# Patient Record
Sex: MALE | Race: WHITE | Employment: FULL TIME | RURAL
[De-identification: names, ages, dates, MRNs, and addresses within clinical notes are randomized per-mention and may not be internally consistent; named-entity substitution may affect disease eponyms.]

---

## 2021-12-03 ENCOUNTER — HOSPITAL ENCOUNTER (EMERGENCY)
Facility: HOSPITAL | Age: 26
Discharge: HOME | End: 2021-12-04
Attending: FAMILY MEDICINE
Payer: COMMERCIAL

## 2021-12-03 DIAGNOSIS — K22.2 ESOPHAGEAL OBSTRUCTION DUE TO FOOD IMPACTION: Primary | ICD-10-CM

## 2021-12-03 DIAGNOSIS — T18.128A ESOPHAGEAL OBSTRUCTION DUE TO FOOD IMPACTION: Primary | ICD-10-CM

## 2021-12-03 PROCEDURE — 99282 EMERGENCY DEPT VISIT SF MDM: CPT

## 2021-12-04 ENCOUNTER — HOSPITAL ENCOUNTER (OUTPATIENT)
Facility: HOSPITAL | Age: 26
Setting detail: HOSPITAL OUTPATIENT SURGERY
Discharge: HOME | End: 2021-12-04
Attending: INTERNAL MEDICINE | Admitting: INTERNAL MEDICINE

## 2021-12-04 ENCOUNTER — ANESTHESIA (OUTPATIENT)
Dept: GASTROENTEROLOGY | Facility: HOSPITAL | Age: 26
End: 2021-12-04

## 2021-12-04 ENCOUNTER — ANESTHESIA EVENT (OUTPATIENT)
Dept: GASTROENTEROLOGY | Facility: HOSPITAL | Age: 26
End: 2021-12-04

## 2021-12-04 VITALS
SYSTOLIC BLOOD PRESSURE: 124 MMHG | OXYGEN SATURATION: 99 % | RESPIRATION RATE: 18 BRPM | BODY MASS INDEX: 17.61 KG/M2 | TEMPERATURE: 98.6 F | DIASTOLIC BLOOD PRESSURE: 67 MMHG | HEART RATE: 69 BPM | WEIGHT: 130 LBS | HEIGHT: 72 IN

## 2021-12-04 VITALS
BODY MASS INDEX: 17.44 KG/M2 | OXYGEN SATURATION: 100 % | RESPIRATION RATE: 18 BRPM | HEART RATE: 89 BPM | HEIGHT: 72 IN | TEMPERATURE: 98 F | DIASTOLIC BLOOD PRESSURE: 50 MMHG | SYSTOLIC BLOOD PRESSURE: 106 MMHG | WEIGHT: 128.8 LBS

## 2021-12-04 DIAGNOSIS — R13.10 DYSPHAGIA: ICD-10-CM

## 2021-12-04 DIAGNOSIS — K20.0 EOSINOPHILIC ESOPHAGITIS: Primary | ICD-10-CM

## 2021-12-04 PROCEDURE — 43247 EGD REMOVE FOREIGN BODY: CPT | Performed by: INTERNAL MEDICINE

## 2021-12-04 PROCEDURE — 25010000002 MIDAZOLAM PER 1MG: Performed by: NURSE ANESTHETIST, CERTIFIED REGISTERED

## 2021-12-04 PROCEDURE — 25010000002 DEXAMETHASONE PER 1 MG: Performed by: NURSE ANESTHETIST, CERTIFIED REGISTERED

## 2021-12-04 PROCEDURE — 25010000002 METOCLOPRAMIDE PER 10 MG: Performed by: NURSE ANESTHETIST, CERTIFIED REGISTERED

## 2021-12-04 PROCEDURE — 99204 OFFICE O/P NEW MOD 45 MIN: CPT | Performed by: INTERNAL MEDICINE

## 2021-12-04 PROCEDURE — 25010000002 ONDANSETRON PER 1 MG: Performed by: NURSE ANESTHETIST, CERTIFIED REGISTERED

## 2021-12-04 PROCEDURE — 25010000002 FENTANYL CITRATE (PF) 50 MCG/ML SOLUTION: Performed by: NURSE ANESTHETIST, CERTIFIED REGISTERED

## 2021-12-04 PROCEDURE — 25010000002 PROPOFOL 10 MG/ML EMULSION: Performed by: NURSE ANESTHETIST, CERTIFIED REGISTERED

## 2021-12-04 PROCEDURE — 43239 EGD BIOPSY SINGLE/MULTIPLE: CPT | Performed by: INTERNAL MEDICINE

## 2021-12-04 RX ORDER — SODIUM CHLORIDE 9 MG/ML
50 INJECTION, SOLUTION INTRAVENOUS CONTINUOUS
Status: DISCONTINUED | OUTPATIENT
Start: 2021-12-04 | End: 2021-12-04 | Stop reason: HOSPADM

## 2021-12-04 RX ORDER — ERGOCALCIFEROL 1.25 MG/1
50000 CAPSULE ORAL WEEKLY
COMMUNITY

## 2021-12-04 RX ORDER — MULTIVIT WITH MINERALS/LUTEIN
250 TABLET ORAL DAILY
COMMUNITY

## 2021-12-04 RX ORDER — MIDAZOLAM HYDROCHLORIDE 2 MG/2ML
INJECTION, SOLUTION INTRAMUSCULAR; INTRAVENOUS AS NEEDED
Status: DISCONTINUED | OUTPATIENT
Start: 2021-12-04 | End: 2021-12-04 | Stop reason: SURG

## 2021-12-04 RX ORDER — MEPERIDINE HYDROCHLORIDE 25 MG/ML
50 INJECTION INTRAMUSCULAR; INTRAVENOUS; SUBCUTANEOUS
Status: DISCONTINUED | OUTPATIENT
Start: 2021-12-04 | End: 2021-12-04 | Stop reason: HOSPADM

## 2021-12-04 RX ORDER — PANTOPRAZOLE SODIUM 40 MG/1
40 TABLET, DELAYED RELEASE ORAL 2 TIMES DAILY
Qty: 60 TABLET | Refills: 2 | Status: SHIPPED | OUTPATIENT
Start: 2021-12-04 | End: 2022-02-22 | Stop reason: SDUPTHER

## 2021-12-04 RX ORDER — PROPOFOL 10 MG/ML
VIAL (ML) INTRAVENOUS AS NEEDED
Status: DISCONTINUED | OUTPATIENT
Start: 2021-12-04 | End: 2021-12-04 | Stop reason: SURG

## 2021-12-04 RX ORDER — MULTIVIT-MIN/IRON/FOLIC ACID/K 18-600-40
1 CAPSULE ORAL DAILY
COMMUNITY

## 2021-12-04 RX ORDER — DEXAMETHASONE SODIUM PHOSPHATE 4 MG/ML
INJECTION, SOLUTION INTRA-ARTICULAR; INTRALESIONAL; INTRAMUSCULAR; INTRAVENOUS; SOFT TISSUE AS NEEDED
Status: DISCONTINUED | OUTPATIENT
Start: 2021-12-04 | End: 2021-12-04 | Stop reason: SURG

## 2021-12-04 RX ORDER — FENTANYL CITRATE 50 UG/ML
INJECTION, SOLUTION INTRAMUSCULAR; INTRAVENOUS AS NEEDED
Status: DISCONTINUED | OUTPATIENT
Start: 2021-12-04 | End: 2021-12-04 | Stop reason: SURG

## 2021-12-04 RX ORDER — METOCLOPRAMIDE HYDROCHLORIDE 5 MG/ML
INJECTION INTRAMUSCULAR; INTRAVENOUS AS NEEDED
Status: DISCONTINUED | OUTPATIENT
Start: 2021-12-04 | End: 2021-12-04 | Stop reason: SURG

## 2021-12-04 RX ORDER — ONDANSETRON 2 MG/ML
4 INJECTION INTRAMUSCULAR; INTRAVENOUS ONCE AS NEEDED
Status: DISCONTINUED | OUTPATIENT
Start: 2021-12-04 | End: 2021-12-04 | Stop reason: HOSPADM

## 2021-12-04 RX ORDER — LANOLIN ALCOHOL/MO/W.PET/CERES
1000 CREAM (GRAM) TOPICAL DAILY
COMMUNITY

## 2021-12-04 RX ORDER — LEVOTHYROXINE SODIUM 0.12 MG/1
125 TABLET ORAL DAILY
COMMUNITY

## 2021-12-04 RX ORDER — ONDANSETRON 2 MG/ML
INJECTION INTRAMUSCULAR; INTRAVENOUS AS NEEDED
Status: DISCONTINUED | OUTPATIENT
Start: 2021-12-04 | End: 2021-12-04 | Stop reason: SURG

## 2021-12-04 RX ORDER — MONTELUKAST SODIUM 10 MG/1
10 TABLET ORAL NIGHTLY
COMMUNITY
End: 2022-02-22 | Stop reason: ALTCHOICE

## 2021-12-04 RX ORDER — LIDOCAINE HYDROCHLORIDE 20 MG/ML
INJECTION, SOLUTION INTRAVENOUS AS NEEDED
Status: DISCONTINUED | OUTPATIENT
Start: 2021-12-04 | End: 2021-12-04 | Stop reason: SURG

## 2021-12-04 RX ORDER — PANTOPRAZOLE SODIUM 40 MG/1
40 TABLET, DELAYED RELEASE ORAL 2 TIMES DAILY
Qty: 60 TABLET | Refills: 2 | Status: SHIPPED | OUTPATIENT
Start: 2021-12-04 | End: 2022-01-11 | Stop reason: HOSPADM

## 2021-12-04 RX ADMIN — FENTANYL CITRATE 50 MCG: 50 INJECTION, SOLUTION INTRAMUSCULAR; INTRAVENOUS at 02:20

## 2021-12-04 RX ADMIN — PROPOFOL 50 MG: 10 INJECTION, EMULSION INTRAVENOUS at 02:33

## 2021-12-04 RX ADMIN — PROPOFOL 50 MG: 10 INJECTION, EMULSION INTRAVENOUS at 02:37

## 2021-12-04 RX ADMIN — FENTANYL CITRATE 50 MCG: 50 INJECTION, SOLUTION INTRAMUSCULAR; INTRAVENOUS at 02:29

## 2021-12-04 RX ADMIN — GLYCOPYRROLATE 0.2 MCG: 0.2 INJECTION, SOLUTION INTRAMUSCULAR; INTRAVITREAL at 02:20

## 2021-12-04 RX ADMIN — SODIUM CHLORIDE 50 ML/HR: 9 INJECTION, SOLUTION INTRAVENOUS at 02:15

## 2021-12-04 RX ADMIN — ONDANSETRON 4 MG: 2 INJECTION INTRAMUSCULAR; INTRAVENOUS at 02:29

## 2021-12-04 RX ADMIN — DEXAMETHASONE SODIUM PHOSPHATE 10 MG: 4 INJECTION, SOLUTION INTRAMUSCULAR; INTRAVENOUS at 02:29

## 2021-12-04 RX ADMIN — PROPOFOL 100 MG: 10 INJECTION, EMULSION INTRAVENOUS at 02:29

## 2021-12-04 RX ADMIN — LIDOCAINE HYDROCHLORIDE 100 MG: 20 INJECTION, SOLUTION INTRAVENOUS at 02:29

## 2021-12-04 RX ADMIN — MIDAZOLAM HYDROCHLORIDE 2 MG: 1 INJECTION, SOLUTION INTRAMUSCULAR; INTRAVENOUS at 02:29

## 2021-12-04 RX ADMIN — METOCLOPRAMIDE 10 MG: 5 INJECTION, SOLUTION INTRAMUSCULAR; INTRAVENOUS at 02:29

## 2021-12-04 ASSESSMENT — PAIN DESCRIPTION - FREQUENCY: FREQUENCY: CONTINUOUS

## 2021-12-04 ASSESSMENT — ENCOUNTER SYMPTOMS: VOMITING: 1

## 2021-12-04 ASSESSMENT — PAIN DESCRIPTION - LOCATION: LOCATION: CHEST

## 2021-12-04 ASSESSMENT — PAIN DESCRIPTION - ORIENTATION: ORIENTATION: MID

## 2021-12-04 NOTE — ED NOTES
Pt resting on bed.  Pt states is very anxious but understands need for going to 6 Manish Veras RN  12/04/21 011

## 2021-12-04 NOTE — DISCHARGE INSTRUCTIONS
No pushing, pulling, tugging,  heavy lifting, or strenuous activity.  No major decision making, driving, or drinking alcoholic beverages for 24 hours. ( due to the medications you have  received)  Always use good hand hygiene/washing techniques.  NO driving while taking pain medications.    * if you have an incision:  Check your incision area every day for signs of infection.   Check for:  * more redness, swelling, or pain  *more fluid or blood  *warmth  *pus or bad smell  To assist you in voiding:  Drink plenty of fluids  Listen to running water while attempting to void.    If you are unable to urinate and you have an uncomfortable urge to void or it has been   6 hours since you were discharged, return to the Emergency Room    - Discharge patient to home (ambulatory).   - Mechanical soft diet today.   - Chopped food from tomorrow  - Repeat upper endoscopy and dilation with savary 12-14 or Balloon 12-15mm in 4 weeks for retreatment.   - PPI BID for 3 months followed by daily  - Food allergy profile  - Will consider topical steriods after path report  - Return to GI office after next EGD.

## 2021-12-04 NOTE — ED TRIAGE NOTES
Pt states was eating boneless chicken and bread approx 2 hours ago. Pt got choked and has been feeling a choking sensation, pressure in chest, difficulty swallowing spit. Pt can talk easily.  No coughing noted

## 2021-12-04 NOTE — ED PROVIDER NOTES
[address]  eMERGENCY dEPARTMENT eNCOUnter      Pt Name: Gabi Power  MRN: [id number]  [place] 1995  Date of evaluation: 12/3/2021  Provider: Jonah Miller DO    CHIEF COMPLAINT       Chief Complaint   Patient presents with    Foreign Body         HISTORY OF PRESENT ILLNESS   (Location/Symptom, Timing/Onset, Context/Setting, Quality, Duration, Modifying Factors, Severity)  Note limiting factors. Gabi Power is a 31 y.o. male who presents to the emergency department for having possible esophageal foreign body. Pt states that around 7:30 pm, he was eating at the [facility]. He was eating chicken and bread. He felt while eating food that it got stuck. He has tried to drink liquids but vomits it up and spitting up his saliva. Pt without any history of needing endoscopy for removal. In the past, he has felt like some food got stuck but eventually got it to go down. States it has happened about 10 times in a year. Nursing Notes were reviewed. REVIEW OF SYSTEMS    (2-9 systems for level 4, 10 or more forlevel 5)     Review of Systems   Gastrointestinal: Positive for vomiting. Pt spitting up saliva and feels like food is stuck down in his esophagus   All other systems reviewed and are negative. PAST MEDICAL HISTORY     Past Medical History:   Diagnosis Date    Heart murmur          SURGICAL HISTORY       Past Surgical History:   Procedure Laterality Date    SKIN CANCER EXCISION           CURRENT MEDICATIONS       Current Discharge Medication List      CONTINUE these medications which have NOT CHANGED    Details   Cholecalciferol (VITAMIN D) 50 MCG (2000 UT) CAPS capsule Take 1 capsule by mouth daily      Multiple Vitamin (MULTI VITAMIN DAILY PO) Take by mouth daily      cetirizine (ZYRTEC) 10 MG tablet Take 10 mg by mouth daily             ALLERGIES     Patient has no known allergies. FAMILY HISTORY     History reviewed.  No pertinent family history. SOCIAL HISTORY       Social History     Socioeconomic History    Marital status: Single     Spouse name: None    Number of children: None    Years of education: None    Highest education level: None   Occupational History    None   Tobacco Use    Smoking status: Never Smoker    Smokeless tobacco: Never Used   Substance and Sexual Activity    Alcohol use: No    Drug use: No    Sexual activity: None   Other Topics Concern    None   Social History Narrative    None     Social Determinants of Health     Financial Resource Strain:     Difficulty of Paying Living Expenses: Not on file   Food Insecurity:     Worried About Running Out of Food in the Last Year: Not on file    Kenneth of Food in the Last Year: Not on file   Transportation Needs:     Lack of Transportation (Medical): Not on file    Lack of Transportation (Non-Medical):  Not on file   Physical Activity:     Days of Exercise per Week: Not on file    Minutes of Exercise per Session: Not on file   Stress:     Feeling of Stress : Not on file   Social Connections:     Frequency of Communication with Friends and Family: Not on file    Frequency of Social Gatherings with Friends and Family: Not on file    Attends Cheondoism Services: Not on file    Active Member of [address] [place] [address] or Organizations: Not on file    Attends Club or Organization Meetings: Not on file    Marital Status: Not on file   Intimate Partner Violence:     Fear of Current or Ex-Partner: Not on file    Emotionally Abused: Not on file    Physically Abused: Not on file    Sexually Abused: Not on file   Housing Stability:     Unable to Pay for Housing in the Last Year: Not on file    Number of [place] in the Last Year: Not on file    Unstable Housing in the Last Year: Not on file       SCREENINGS             PHYSICAL EXAM    (up to 7 for level 4, 8 or more for level 5)     ED Triage Vitals [12/03/21 3279]   BP Temp Temp Source Pulse Resp SpO2 Height Weight 124/67 98.6 °F (37 °C) Oral 69 18 99 % 6' (1.829 m) 130 lb (59 kg)       Physical Exam  Vitals and nursing note reviewed. Constitutional:       General: He is not in acute distress. Appearance: Normal appearance. HENT:      Head: Normocephalic. Mouth/Throat:      Mouth: Mucous membranes are moist.      Pharynx: Oropharynx is clear. Eyes:      Extraocular Movements: Extraocular movements intact. Conjunctiva/sclera: Conjunctivae normal.      Pupils: Pupils are equal, round, and reactive to light. Cardiovascular:      Rate and Rhythm: Normal rate and regular rhythm. Heart sounds: Normal heart sounds. Pulmonary:      Effort: Pulmonary effort is normal.      Breath sounds: Normal breath sounds. Abdominal:      Palpations: Abdomen is soft. Tenderness: There is no abdominal tenderness. Musculoskeletal:         General: Normal range of motion. Cervical back: Neck supple. Skin:     General: Skin is warm and dry. Neurological:      Mental Status: He is alert and oriented to person, place, and time. Psychiatric:         Mood and Affect: Mood normal.         Behavior: Behavior normal.         DIAGNOSTIC RESULTS     EKG: All EKG's are interpreted by the Emergency Department Physician who either signs or Co-signs this chart in the absence of a cardiologist.    None    RADIOLOGY:   Non-plain film images such as CT, Ultrasound and MRI are read by the radiologist. Plain radiographic images are visualized andpreliminarily interpreted by the emergency physician with the below findings:    None    Interpretationper the Radiologist below, if available at the time of this note:    No orders to display         ED BEDSIDE ULTRASOUND:   Performed by ED Physician - none    LABS:  Labs Reviewed - No data to display    All other labs were within normal range or not returned as of this dictation.     EMERGENCY DEPARTMENT COURSE and DIFFERENTIAL DIAGNOSIS/MDM:   Vitals:    Vitals:    12/03/21 2359   BP: 124/67   Pulse: 69   Resp: 18   Temp: 98.6 °F (37 °C)   TempSrc: Oral   SpO2: 99%   Weight: 130 lb (59 kg)   Height: 6' (1.829 m)           CRITICAL CARE TIME   Total Critical Care time was 0 minutes, excluding separatelyreportable procedures. There was a high probability ofclinically significant/life threatening deterioration in the patient's condition which required my urgent intervention. CONSULTS:  [facility] and spoke with Dr. Walt Mancilla who will accept patient to their facility and call in the crew for EGD    PROCEDURES:  None    PROGRESS NOTES:    Pt spitting up saliva in room. Most likely with esophageal food obstruction. Pt not vomiting. No airway trouble. Pt tried to drink warm soda and within 10 sec vomited multiple times and nothing came up and still feels like its lodged. Still spitting up saliva. FINAL IMPRESSION      1.  Esophageal obstruction due to food impaction New Problem         DISPOSITION/PLAN   DISPOSITION        PATIENT REFERRED TO:    Pt to be discharged and go to [facility] to ED to register for outpatient surgery POV (private vehicle)          DISCHARGE MEDICATIONS:  Current Discharge Medication List          (Please note that portions of this note were completed with a voice recognition program.  Efforts were made to edit the dictations but occasionallywords are mis-transcribed.)    Sha Price DO (electronically signed)  Attending Emergency Physician          Sha Price DO  12/04/21 0110

## 2021-12-04 NOTE — CONSULTS
In Patient Consult      Date of Consultation: 2021  Patient Name: Allan Burton  MRN: [id number]  : 1995     Referring provider: Kolby Guaman MD    Primary care provider:  Columba Llanos APRN    Reason for consultation: Esophageal food bolus, dysphagia    History of Present Illness: This is 26-year-old young male patient with a prior history of esophageal dysphagia prior history of esophageal food bolus was brought into emergency room at [facility] and subsequently transferred here at [facility] for further management of his food bolus in the esophagus.    He states that he had a chicken roll in the evening for his dinner following which he felt that chicken piece was stuck in the throat and he was not able to swallow any saliva.  He had a similar episodes multiple times in the past and was able to ultimately swallow the food bolus with manipulation and drinking water.  Time he tried to drink water but he could not swallow anything and vomited out.  Also felt little chest discomfort with shortness of breathing shortly that resolved thereafter.  Denies any abdominal pain.    He denies any prior history of EGD.  Denies any medications no significant reflux symptoms.  He states that his grandfather had issues with swallowing.      Subjective     Past Medical History:   Diagnosis Date   • Disease of thyroid gland        Past Surgical History:   Procedure Laterality Date   • SKIN CANCER EXCISION         History reviewed. No pertinent family history.    Social History     Socioeconomic History   • Marital status: Single   Tobacco Use   • Smoking status: Never Smoker   • Smokeless tobacco: Never Used   Vaping Use   • Vaping Use: Never used   Substance and Sexual Activity   • Alcohol use: Never   • Drug use: Never   • Sexual activity: Defer         Current Facility-Administered Medications:   •  sodium chloride 0.9 % infusion, 50 mL/hr, Intravenous,  "Riccardo, Kolby Guaman MD, Last Rate: 50 mL/hr at 12/04/21 0215, 50 mL/hr at 12/04/21 0215    No Known Allergies    Review of Systems   Constitutional: Negative for fever.   HENT: Positive for trouble swallowing. Negative for sore throat.    Eyes: Negative for visual disturbance.   Respiratory: Negative for cough, chest tightness and shortness of breath.    Cardiovascular: Negative for chest pain, palpitations and leg swelling.   Gastrointestinal: Negative for abdominal pain, blood in stool, constipation, diarrhea, nausea, vomiting and indigestion.   Endocrine: Negative for polyphagia.   Genitourinary: Negative for dysuria and hematuria.   Musculoskeletal: Negative for back pain, joint swelling and neck pain.   Skin: Negative for rash, skin lesions and bruise.   Neurological: Negative for dizziness, seizures, speech difficulty, weakness, numbness and confusion.   Hematological: Negative for adenopathy. Does not bruise/bleed easily.   Psychiatric/Behavioral: Negative for hallucinations and depressed mood.        The following portions of the patient's history were reviewed and updated as appropriate: allergies, current medications, past family history, past medical history, past social history, past surgical history and problem list.    Objective     Vitals:    12/04/21 0206   BP: 113/63   BP Location: Right arm   Patient Position: Sitting   Pulse: 97   Resp: 18   Temp: 98.8 °F (37.1 °C)   TempSrc: Temporal   SpO2: 98%   Weight: 58.4 kg (128 lb 12.8 oz)   Height: 182.9 cm (72\")       Physical Exam  Vitals and nursing note reviewed.   Constitutional:       Appearance: He is well-developed.      Comments: He is in mild distress with the spitting of saliva without able to swallow anything.   HENT:      Head: Normocephalic and atraumatic.      Right Ear: External ear normal.      Left Ear: External ear normal.   Eyes:      Conjunctiva/sclera: Conjunctivae normal.   Neck:      Thyroid: No thyromegaly.      " Trachea: No tracheal deviation.   Cardiovascular:      Rate and Rhythm: Normal rate and regular rhythm.      Heart sounds: No murmur heard.      Pulmonary:      Effort: Pulmonary effort is normal. No respiratory distress.      Breath sounds: Normal breath sounds.   Abdominal:      General: Bowel sounds are normal. There is no distension.      Palpations: Abdomen is soft. There is no mass.      Tenderness: There is no abdominal tenderness.      Hernia: No hernia is present.   Musculoskeletal:         General: Normal range of motion.      Cervical back: Normal range of motion.   Skin:     General: Skin is warm and dry.   Neurological:      General: No focal deficit present.      Mental Status: He is alert and oriented to person, place, and time.      Cranial Nerves: No cranial nerve deficit.      Sensory: No sensory deficit.   Psychiatric:         Mood and Affect: Mood normal.         Behavior: Behavior normal.         Thought Content: Thought content normal.         Judgment: Judgment normal.               Invalid input(s): PROT    Imaging Results (Last 24 Hours)     ** No results found for the last 24 hours. **          Assessment / Plan      Assessment/Recommendations:   1.  Esophageal food bolus  2.  Chronic esophageal dysphagia    Patient's history is more suggestive of eosinophilic esophagitis with a stricture.. He had multiple episodes of food bolus in the past however he was able to pass down without any endoscopy.  This time he feels that food is not going down.  He does have some family history of dysphagia especially with a grandfather but no diagnosed achalasia or EOE in the family.  Patient was initially seen at [facility] emergency room and transferred here for further management.    He needs an urgent EGD to dislodge the food bolus.  I have discussed the risk and benefits involved and is agreeable to proceed with the procedure.  Keep n.p.o.  Scheduled for an urgent EGD  Will recommend  further based on endoscopy findings.  I have discussed with him that if there is any stricture we may have to come back again for esophageal dilatation if there is any significant inflammation.      Thank you very much for letting me participate in the care of this patient.  Please do not hesitate to call me if you have any questions.    Kolby Guaman MD  [facility] [place]  12/4/2021  02:27 EST    Please note that portions of this note may have been completed with a voice recognition program.

## 2021-12-04 NOTE — ANESTHESIA PREPROCEDURE EVALUATION
Anesthesia Evaluation     Patient summary reviewed and Nursing notes reviewed   no history of anesthetic complications:  NPO Solid Status: > 8 hours  NPO Liquid Status: > 8 hours           Airway   Mallampati: II  TM distance: >3 FB  Neck ROM: full  no difficulty expected  Dental - normal exam     Pulmonary - negative pulmonary ROS and normal exam   Cardiovascular - negative cardio ROS and normal exam    Rhythm: regular  Rate: normal        Neuro/Psych- negative ROS  GI/Hepatic/Renal/Endo    (+)   thyroid problem hypothyroidism    Musculoskeletal (-) negative ROS    Abdominal    Substance History - negative use     OB/GYN negative ob/gyn ROS         Other - negative ROS                       Anesthesia Plan    ASA 2 - emergent     MAC   (Pt told that intravenous sedation will be used as the primary anesthetic along with local anesthesia if necessary. Every effort will be made to make sure the patient is comfortable.     The patient was told they may or may not have recall for the procedure. It was further explained that if the MAC was not adequate that a general anesthetic with either an LMA or endotracheal tube would be required.     Will proceed with the plan of care.)  intravenous induction     Anesthetic plan, all risks, benefits, and alternatives have been provided, discussed and informed consent has been obtained with: patient.

## 2021-12-04 NOTE — ANESTHESIA POSTPROCEDURE EVALUATION
Patient: Allan Burton    Procedure Summary     Date: 12/04/21 Room / Location: [facility] ENDOSCOPY 2 / [facility]    Anesthesia Start: 0215 Anesthesia Stop: 0243    Procedure: ESOPHAGOGASTRODUODENOSCOPY WITH FOREIGN BODY REMOVAL and biopsy (N/A ) Diagnosis:     Surgeons: Kolby Guaman MD Provider: Ross Logan CRNA    Anesthesia Type: MAC ASA Status: 2 - Emergent          Anesthesia Type: MAC    Vitals  Vitals Value Taken Time   /50 12/04/21 0337   Temp 98 °F (36.7 °C) 12/04/21 0248   Pulse 87 12/04/21 0341   Resp 18 12/04/21 0337   SpO2 100 % 12/04/21 0342   Vitals shown include unvalidated device data.          Post Anesthesia Care and Evaluation    Patient location during evaluation: PACU  Patient participation: complete - patient participated  Level of consciousness: awake  Pain score: 1  Pain management: adequate  Airway patency: patent  Anesthetic complications: No anesthetic complications  PONV Status: controlled  Cardiovascular status: acceptable and stable  Respiratory status: acceptable and nasal cannula  Hydration status: acceptable

## 2021-12-06 ENCOUNTER — TELEPHONE (OUTPATIENT)
Dept: GASTROENTEROLOGY | Facility: CLINIC | Age: 26
End: 2021-12-06

## 2021-12-06 NOTE — TELEPHONE ENCOUNTER
----- Message from Elke Turner MA sent at 12/6/2021  8:58 AM EST -----    ----- Message -----  From: Kolby Guaman MD  Sent: 12/4/2021   3:15 AM EST  To: Elke Turner MA    Can we cancel mail order ppi, I have sent again to local pharmacy

## 2021-12-09 LAB
LAB AP CASE REPORT: NORMAL
PATH REPORT.FINAL DX SPEC: NORMAL

## 2021-12-27 DIAGNOSIS — R13.19 ESOPHAGEAL DYSPHAGIA: Primary | ICD-10-CM

## 2021-12-27 DIAGNOSIS — K20.0 ESOPHAGITIS, EOSINOPHILIC: ICD-10-CM

## 2021-12-27 RX ORDER — SODIUM CHLORIDE 9 MG/ML
30 INJECTION, SOLUTION INTRAVENOUS CONTINUOUS PRN
Status: CANCELLED | OUTPATIENT
Start: 2021-12-27

## 2022-01-04 DIAGNOSIS — Z01.818 PREOP TESTING: Primary | ICD-10-CM

## 2022-01-08 ENCOUNTER — LAB (OUTPATIENT)
Dept: LAB | Facility: HOSPITAL | Age: 27
End: 2022-01-08

## 2022-01-08 DIAGNOSIS — Z01.818 PREOP TESTING: ICD-10-CM

## 2022-01-08 PROCEDURE — C9803 HOPD COVID-19 SPEC COLLECT: HCPCS

## 2022-01-08 PROCEDURE — U0004 COV-19 TEST NON-CDC HGH THRU: HCPCS

## 2022-01-09 LAB — SARS-COV-2 RNA PNL SPEC NAA+PROBE: NOT DETECTED

## 2022-01-10 NOTE — PRE-PROCEDURE INSTRUCTIONS
PAT phone history completed with pt for upcoming procedure on 1/11/22 with Dr. Guaman.      PAT PASS GIVEN/REVIEWED WITH PT.  VERBALIZED UNDERSTANDING OF THE FOLLOWING:  DO NOT EAT, DRINK, SMOKE, USE SMOKELESS TOBACCO OR CHEW GUM AFTER MIDNIGHT THE NIGHT BEFORE SURGERY.  THIS ALSO INCLUDES HARD CANDIES AND MINTS.    DO NOT SHAVE THE AREA TO BE OPERATED ON AT LEAST 48 HOURS PRIOR TO THE PROCEDURE.  DO NOT WEAR MAKE UP OR NAIL POLISH.  DO NOT LEAVE IN ANY PIERCING OR WEAR JEWELRY THE DAY OF SURGERY.      DO NOT USE ADHESIVES IF YOU WEAR DENTURES.    DO NOT WEAR EYE CONTACTS; BRING IN YOUR GLASSES.    ONLY TAKE MEDICATION THE MORNING OF YOUR PROCEDURE IF INSTRUCTED BY YOUR SURGEON WITH ENOUGH WATER TO SWALLOW THE MEDICATION.  IF YOUR SURGEON DID NOT SPECIFY WHICH MEDICATIONS TO TAKE, YOU WILL NEED TO CALL THEIR OFFICE FOR FURTHER INSTRUCTIONS AND DO AS THEY INSTRUCT.    LEAVE ANYTHING YOU CONSIDER VALUABLE AT HOME.    YOU WILL NEED TO ARRANGE FOR SOMEONE TO DRIVE YOU HOME AFTER SURGERY.  IT IS RECOMMENDED THAT YOU DO NOT DRIVE, WORK, DRINK ALCOHOL OR MAKE MAJOR DECISIONS FOR AT LEAST 24 HOURS AFTER YOUR PROCEDURE IS COMPLETE.      THE DAY OF YOUR PROCEDURE, BRING IN THE FOLLOWING IF APPLICABLE:   PICTURE ID AND INSURANCE/MEDICARE OR MEDICAID CARDS/ANY CO-PAY THAT MAY BE DUE   COPY OF ADVANCED DIRECTIVE/LIVING WILL/POWER OR    CPAP/BIPAP/INHALERS   SKIN PREP SHEET   YOUR PREADMISSION TESTING PASS (IF NOT A PHONE HISTORY)           COVID self-quarantine instructions reviewed with the pt.  Verbalized understanding.

## 2022-01-11 ENCOUNTER — ANESTHESIA EVENT (OUTPATIENT)
Dept: GASTROENTEROLOGY | Facility: HOSPITAL | Age: 27
End: 2022-01-11

## 2022-01-11 ENCOUNTER — ANESTHESIA (OUTPATIENT)
Dept: GASTROENTEROLOGY | Facility: HOSPITAL | Age: 27
End: 2022-01-11

## 2022-01-11 ENCOUNTER — HOSPITAL ENCOUNTER (OUTPATIENT)
Facility: HOSPITAL | Age: 27
Setting detail: HOSPITAL OUTPATIENT SURGERY
Discharge: HOME | End: 2022-01-11
Attending: INTERNAL MEDICINE | Admitting: INTERNAL MEDICINE

## 2022-01-11 VITALS
DIASTOLIC BLOOD PRESSURE: 53 MMHG | WEIGHT: 135 LBS | SYSTOLIC BLOOD PRESSURE: 98 MMHG | BODY MASS INDEX: 18.28 KG/M2 | HEART RATE: 62 BPM | HEIGHT: 72 IN | TEMPERATURE: 98 F | RESPIRATION RATE: 16 BRPM | OXYGEN SATURATION: 98 %

## 2022-01-11 DIAGNOSIS — R13.19 ESOPHAGEAL DYSPHAGIA: ICD-10-CM

## 2022-01-11 DIAGNOSIS — K20.0 ESOPHAGITIS, EOSINOPHILIC: ICD-10-CM

## 2022-01-11 PROCEDURE — 43239 EGD BIOPSY SINGLE/MULTIPLE: CPT | Performed by: INTERNAL MEDICINE

## 2022-01-11 PROCEDURE — 43249 ESOPH EGD DILATION <30 MM: CPT | Performed by: INTERNAL MEDICINE

## 2022-01-11 PROCEDURE — C1769 GUIDE WIRE: HCPCS | Performed by: INTERNAL MEDICINE

## 2022-01-11 PROCEDURE — 25010000002 PROPOFOL 200 MG/20ML EMULSION: Performed by: NURSE ANESTHETIST, CERTIFIED REGISTERED

## 2022-01-11 PROCEDURE — 25010000002 ONDANSETRON PER 1 MG: Performed by: NURSE ANESTHETIST, CERTIFIED REGISTERED

## 2022-01-11 RX ORDER — PROPOFOL 10 MG/ML
INJECTION, EMULSION INTRAVENOUS AS NEEDED
Status: DISCONTINUED | OUTPATIENT
Start: 2022-01-11 | End: 2022-01-11 | Stop reason: SURG

## 2022-01-11 RX ORDER — LIDOCAINE HYDROCHLORIDE 20 MG/ML
INJECTION, SOLUTION INTRAVENOUS AS NEEDED
Status: DISCONTINUED | OUTPATIENT
Start: 2022-01-11 | End: 2022-01-11 | Stop reason: SURG

## 2022-01-11 RX ORDER — ONDANSETRON 2 MG/ML
INJECTION INTRAMUSCULAR; INTRAVENOUS AS NEEDED
Status: DISCONTINUED | OUTPATIENT
Start: 2022-01-11 | End: 2022-01-11 | Stop reason: SURG

## 2022-01-11 RX ORDER — SODIUM CHLORIDE 9 MG/ML
30 INJECTION, SOLUTION INTRAVENOUS CONTINUOUS PRN
Status: DISCONTINUED | OUTPATIENT
Start: 2022-01-11 | End: 2022-01-11 | Stop reason: HOSPADM

## 2022-01-11 RX ADMIN — ONDANSETRON 4 MG: 2 INJECTION INTRAMUSCULAR; INTRAVENOUS at 13:55

## 2022-01-11 RX ADMIN — PROPOFOL 100 MG: 10 INJECTION, EMULSION INTRAVENOUS at 13:55

## 2022-01-11 RX ADMIN — PROPOFOL 100 MG: 10 INJECTION, EMULSION INTRAVENOUS at 13:58

## 2022-01-11 RX ADMIN — LIDOCAINE HYDROCHLORIDE 60 MG: 20 INJECTION, SOLUTION INTRAVENOUS at 13:55

## 2022-01-11 RX ADMIN — PROPOFOL 100 MG: 10 INJECTION, EMULSION INTRAVENOUS at 14:03

## 2022-01-11 RX ADMIN — SODIUM CHLORIDE 30 ML/HR: 9 INJECTION, SOLUTION INTRAVENOUS at 12:02

## 2022-01-11 NOTE — H&P
[facility]  HISTORY AND PHYSICAL    Patient Name: Allan Burton  : 1995  MRN: [id number]    Chief Complaint:   For EGD    History Of Presenting Illness:    Dysphagia  EoE  Esophageal stricture    Past Medical History:   Diagnosis Date   • Disease of thyroid gland    • Jaundice     as a baby   • Murmur    • Seasonal allergies    • Skin cancer     Face       Past Surgical History:   Procedure Laterality Date   • ENDOSCOPY     • ENDOSCOPY WITH FOREIGN BODY REMOVAL N/A 2021    Procedure: ESOPHAGOGASTRODUODENOSCOPY WITH FOREIGN BODY REMOVAL and biopsy;  Surgeon: Kolby Guaman MD;  Location: [facility] ENDOSCOPY;  Service: Gastroenterology;  Laterality: N/A;   • SKIN CANCER EXCISION     • WISDOM TOOTH EXTRACTION         Social History     Socioeconomic History   • Marital status: Single   Tobacco Use   • Smoking status: Never Smoker   • Smokeless tobacco: Never Used   Vaping Use   • Vaping Use: Never used   Substance and Sexual Activity   • Alcohol use: Never   • Drug use: Never   • Sexual activity: Defer       History reviewed. No pertinent family history.    Prior to Admission Medications:  Medications Prior to Admission   Medication Sig Dispense Refill Last Dose   • ELDERBERRY PO Take  by mouth.   1/10/2022 at Unknown time   • levothyroxine (SYNTHROID, LEVOTHROID) 125 MCG tablet Take 125 mcg by mouth Daily.   1/10/2022 at Unknown time   • montelukast (SINGULAIR) 10 MG tablet Take 10 mg by mouth Every Night.   1/10/2022 at Unknown time   • Multiple Vitamins-Minerals (EQ MULTIVITAMINS ADULT GUMMY PO) Take  by mouth.   Past Week at Unknown time   • pantoprazole (PROTONIX) 40 MG EC tablet Take 1 tablet by mouth 2 (Two) Times a Day. 60 tablet 2 1/10/2022 at Unknown time   • vitamin B-12 (CYANOCOBALAMIN) 1000 MCG tablet Take 1,000 mcg by mouth Daily.   Past Week at Unknown time   • vitamin C (ASCORBIC ACID) 250 MG tablet Take 250 mg by mouth Daily.   Past Week at Unknown time   •  vitamin D (ERGOCALCIFEROL) 1.25 MG (42128 UT) capsule capsule Take 50,000 Units by mouth 1 (One) Time Per Week.   Past Month at Unknown time   • pantoprazole (PROTONIX) 40 MG EC tablet Take 1 tablet by mouth 2 (Two) Times a Day. 60 tablet 2        Allergies:  No Known Allergies     Vitals: Temp:  [97.4 °F (36.3 °C)] 97.4 °F (36.3 °C)  Heart Rate:  [58] 58  Resp:  [18] 18  BP: (126)/(57) 126/57    Review Of Systems:  Constitutional:  Negative for chills, fever, and unexpected weight change.  Respiratory:  Negative for cough, chest tightness, shortness of breath, and wheezing.  Cardiovascular:  Negative for chest pain, palpitations, and leg swelling.  Gastrointestinal:  Negative for abdominal distention, abdominal pain, Nausea, vomiting.  Neurological:  Negative for Weakness, numbness, and headaches.     Physical Exam:    General Appearance:  Alert, cooperative, in no acute distress.   Lungs:   Clear to auscultation, respirations regular, even and                 unlabored.   Heart:  Regular rhythm and normal rate.   Abdomen:   Normal bowel sounds, no masses, no organomegaly. Soft, non-tender, non-distended   Neurologic: Alert and oriented x 3. Moves all four limbs equally       Plan: ESOPHAGOGASTRODUODENOSCOPY WITH DILATATION CPT CODE: 28477 (N/A)     Kolby Guaman MD  1/11/2022

## 2022-01-11 NOTE — ANESTHESIA POSTPROCEDURE EVALUATION
Patient: Allan Burton    Procedure Summary     Date: 01/11/22 Room / Location: [facility] ENDOSCOPY 2 / [facility] ENDOSCOPY    Anesthesia Start: 1345 Anesthesia Stop: 1419    Procedure: ESOPHAGOGASTRODUODENOSCOPY WITH DILATATION (N/A ) Diagnosis:       Esophageal dysphagia      Esophagitis, eosinophilic      (Esophageal dysphagia [R13.19])      (Esophagitis, eosinophilic [K20.0])    Surgeons: Kolby Guaman MD Provider: La Suarez CRNA    Anesthesia Type: MAC ASA Status: 3 - Emergent          Anesthesia Type: MAC    Vitals  Vitals Value Taken Time   BP 98/53 01/11/22 1438   Temp 98 °F (36.7 °C) 01/11/22 1420   Pulse 62 01/11/22 1438   Resp 16 01/11/22 1438   SpO2 98 % 01/11/22 1438           Post Anesthesia Care and Evaluation    Patient location during evaluation: PHASE II  Patient participation: complete - patient participated  Level of consciousness: awake and alert  Pain score: 0  Pain management: satisfactory to patient  Airway patency: patent  Anesthetic complications: No anesthetic complications  PONV Status: none  Cardiovascular status: acceptable and stable  Respiratory status: acceptable  Hydration status: acceptable

## 2022-01-11 NOTE — ANESTHESIA PREPROCEDURE EVALUATION
Anesthesia Evaluation     Patient summary reviewed and Nursing notes reviewed   no history of anesthetic complications:  NPO Solid Status: > 8 hours  NPO Liquid Status: > 8 hours           Airway   Mallampati: II  TM distance: >3 FB  Neck ROM: full  no difficulty expected and Possible difficult intubation  Dental - normal exam     Pulmonary - normal exam   (+) asthma,  (-) not a smoker  Cardiovascular - normal exam    Rhythm: regular  Rate: normal        Neuro/Psych- negative ROS  GI/Hepatic/Renal/Endo    (+)   thyroid problem hypothyroidism    Musculoskeletal (-) negative ROS    Abdominal    Substance History - negative use     OB/GYN negative ob/gyn ROS         Other      history of cancer remission                      Anesthesia Plan    ASA 3 - emergent     MAC   (Pt told that intravenous sedation will be used as the primary anesthetic along with local anesthesia if necessary. Every effort will be made to make sure the patient is comfortable.     The patient was told they may or may not have recall for the procedure.  Will proceed with the plan of care.)  intravenous induction     Anesthetic plan, all risks, benefits, and alternatives have been provided, discussed and informed consent has been obtained with: patient.

## 2022-01-11 NOTE — DISCHARGE INSTRUCTIONS
- Discharge patient to home (ambulatory).   - Mechanical soft diet today.   - Continue present medications.   - Continue PPI  - Will follow up with Food allergy profile  - Await pathology results.   - Return to my office in 6 weeks.    Please follow all post op instructions and follow up appointment time from your physician's office included in your discharge packet.  .   No pushing, pulling, tugging,  heavy lifting, or strenuous activity.  No major decision making, driving, or drinking alcoholic beverages for 24 hours. ( due to the medications you have  received)  Always use good hand hygiene/washing techniques.  NO driving while taking pain medications.    * if you have an incision:  Check your incision area every day for signs of infection.   Check for:  * more redness, swelling, or pain  *more fluid or blood  *warmth  *pus or bad smell  To assist you in voiding:  Drink plenty of fluids  Listen to running water while attempting to void.    If you are unable to urinate and you have an uncomfortable urge to void or it has been   6 hours since you were discharged, return to the Emergency Room

## 2022-01-15 LAB
LAB AP CASE REPORT: NORMAL
LAB AP CLINICAL INFORMATION: NORMAL
PATH REPORT.FINAL DX SPEC: NORMAL

## 2022-02-22 ENCOUNTER — OFFICE VISIT (OUTPATIENT)
Dept: GASTROENTEROLOGY | Facility: CLINIC | Age: 27
End: 2022-02-22

## 2022-02-22 VITALS
TEMPERATURE: 98 F | SYSTOLIC BLOOD PRESSURE: 112 MMHG | BODY MASS INDEX: 18.15 KG/M2 | WEIGHT: 134 LBS | DIASTOLIC BLOOD PRESSURE: 80 MMHG | HEIGHT: 72 IN

## 2022-02-22 DIAGNOSIS — K20.0 ESOPHAGITIS, EOSINOPHILIC: ICD-10-CM

## 2022-02-22 DIAGNOSIS — K22.2 ESOPHAGEAL STRICTURE: ICD-10-CM

## 2022-02-22 DIAGNOSIS — R13.19 ESOPHAGEAL DYSPHAGIA: Primary | ICD-10-CM

## 2022-02-22 PROCEDURE — 99214 OFFICE O/P EST MOD 30 MIN: CPT | Performed by: INTERNAL MEDICINE

## 2022-02-22 RX ORDER — BUDESONIDE 1 MG/2ML
INHALANT ORAL
Qty: 60 EACH | Refills: 2 | Status: SHIPPED | OUTPATIENT
Start: 2022-02-22 | End: 2022-04-06 | Stop reason: SDUPTHER

## 2022-02-22 RX ORDER — LEVOCETIRIZINE DIHYDROCHLORIDE 5 MG/1
5 TABLET, FILM COATED ORAL EVERY EVENING
COMMUNITY

## 2022-02-22 RX ORDER — MONTELUKAST SODIUM 10 MG/1
10 TABLET ORAL NIGHTLY
COMMUNITY

## 2022-02-22 RX ORDER — PANTOPRAZOLE SODIUM 40 MG/1
40 TABLET, DELAYED RELEASE ORAL DAILY
Qty: 90 TABLET | Refills: 3 | Status: SHIPPED | OUTPATIENT
Start: 2022-02-22

## 2022-02-22 NOTE — PROGRESS NOTES
Follow Up Note     Date: 2022   Patient Name: Allan Burton  MRN: [id number]  : 1995     Referring Physician: Melissa Jolly DO    Chief Complaint:    Chief Complaint   Patient presents with   • Follow-up   • Difficulty Swallowing       Interval History:   2022  Allan Burton is a 26 y.o. male who is here today for follow up for his dysphagia.  He states that after his dilatation he felt well for few weeks, now started again feeling of some tightness in the lower esophagus with mild dysphagia.  Denies any reflux symptoms.    2021  This is 26-year-old young male patient with a prior history of esophageal dysphagia prior history of esophageal food bolus was brought into emergency room at [facility] and subsequently transferred here at [facility] for further management of his food bolus in the esophagus.     He states that he had a chicken roll in the evening for his dinner following which he felt that chicken piece was stuck in the throat and he was not able to swallow any saliva.  He had a similar episodes multiple times in the past and was able to ultimately swallow the food bolus with manipulation and drinking water.  Time he tried to drink water but he could not swallow anything and vomited out.  Also felt little chest discomfort with shortness of breathing shortly that resolved thereafter.  Denies any abdominal pain.     He denies any prior history of EGD.  Denies any medications no significant reflux symptoms.  He states that his grandfather had issues with swallowing.  Subjective      Past Medical History:   Past Medical History:   Diagnosis Date   • Disease of thyroid gland    • Jaundice     as a baby   • Murmur    • Seasonal allergies    • Skin cancer     Face     Past Surgical History:   Past Surgical History:   Procedure Laterality Date   • ENDOSCOPY     • ENDOSCOPY N/A 2022    Procedure: ESOPHAGOGASTRODUODENOSCOPY WITH DILATATION;  Surgeon:  Kolby Guaman MD;  Location: [facility] ENDOSCOPY;  Service: Gastroenterology;  Laterality: N/A;   • ENDOSCOPY WITH FOREIGN BODY REMOVAL N/A 12/4/2021    Procedure: ESOPHAGOGASTRODUODENOSCOPY WITH FOREIGN BODY REMOVAL and biopsy;  Surgeon: Kolby Guaman MD;  Location: [facility] ENDOSCOPY;  Service: Gastroenterology;  Laterality: N/A;   • SKIN CANCER EXCISION  2014   • WISDOM TOOTH EXTRACTION         Family History: History reviewed. No pertinent family history.    Social History:   Social History     Socioeconomic History   • Marital status: Single   Tobacco Use   • Smoking status: Never Smoker   • Smokeless tobacco: Never Used   Vaping Use   • Vaping Use: Never used   Substance and Sexual Activity   • Alcohol use: Never   • Drug use: Never   • Sexual activity: Defer       Medications:     Current Outpatient Medications:   •  ELDERBERRY PO, Take  by mouth., Disp: , Rfl:   •  levocetirizine (XYZAL) 5 MG tablet, Take 5 mg by mouth Every Evening., Disp: , Rfl:   •  levothyroxine (SYNTHROID, LEVOTHROID) 125 MCG tablet, Take 125 mcg by mouth Daily., Disp: , Rfl:   •  montelukast (SINGULAIR) 10 MG tablet, Take 10 mg by mouth Every Night., Disp: , Rfl:   •  Multiple Vitamins-Minerals (EQ MULTIVITAMINS ADULT GUMMY PO), Take  by mouth., Disp: , Rfl:   •  pantoprazole (PROTONIX) 40 MG EC tablet, Take 1 tablet by mouth 2 (Two) Times a Day., Disp: 60 tablet, Rfl: 2  •  vitamin B-12 (CYANOCOBALAMIN) 1000 MCG tablet, Take 1,000 mcg by mouth Daily., Disp: , Rfl:   •  vitamin C (ASCORBIC ACID) 250 MG tablet, Take 250 mg by mouth Daily., Disp: , Rfl:   •  vitamin D (ERGOCALCIFEROL) 1.25 MG (08712 UT) capsule capsule, Take 50,000 Units by mouth 1 (One) Time Per Week., Disp: , Rfl:     Allergies:   No Known Allergies    Review of Systems:   Review of Systems   Constitutional: Negative for appetite change, fatigue, fever and unexpected weight loss.   HENT: Positive for trouble swallowing.    Gastrointestinal: Negative for  "abdominal distention, abdominal pain, anal bleeding, blood in stool, constipation, diarrhea, nausea, rectal pain, vomiting, GERD and indigestion.       The following portions of the patient's history were reviewed and updated as appropriate: allergies, current medications, past family history, past medical history, past social history, past surgical history and problem list.    Objective     Physical Exam:  Vital Signs:   Vitals:    02/22/22 1542   BP: 112/80   Temp: 98 °F (36.7 °C)   TempSrc: Infrared   Weight: 60.8 kg (134 lb)   Height: 182.9 cm (72\")       Physical Exam  Constitutional:       Comments: He is poorly built and nourished   HENT:      Head: Normocephalic and atraumatic.   Eyes:      Conjunctiva/sclera: Conjunctivae normal.   Abdominal:      General: Abdomen is flat. There is no distension.      Palpations: There is no mass.      Tenderness: There is no abdominal tenderness. There is no guarding or rebound.      Hernia: No hernia is present.   Musculoskeletal:      Cervical back: Normal range of motion and neck supple.   Neurological:      Mental Status: He is alert.         Results Review:   I reviewed the patient's new clinical results.    Admission on 01/11/2022, Discharged on 01/11/2022   Component Date Value Ref Range Status   • Case Report 01/11/2022    Final                    Value:Surgical Pathology Report                         Case: [id number]                                  Authorizing Provider:  Kolby Guaman MD  Collected:           01/11/2022 02:00 PM          Ordering Location:     [facility]    Received:            01/12/2022 09:35 AM                                 [facility]                                                                    Pathologist:           Twin Carrington MD                                                        Specimen:    Esophagus                                                                                 • Clinical " Information 01/11/2022    Final                    Value:This result contains rich text formatting which cannot be displayed here.   • Final Diagnosis 01/11/2022    Final                    Value:This result contains rich text formatting which cannot be displayed here.   Lab on 01/08/2022   Component Date Value Ref Range Status   • COVID19 01/08/2022 Not Detected  Not Detected - Ref. Range Final   Admission on 12/04/2021, Discharged on 12/04/2021   Component Date Value Ref Range Status   • Case Report 12/04/2021    Final                    Value:Surgical Pathology Report                         Case: [id number]                                  Authorizing Provider:  Kolby Guaman MD  Collected:           12/04/2021 02:33 AM          Ordering Location:     [facility] Received:            12/06/2021 09:35 AM          Pathologist:           Jp Santillan MD                                                             Specimens:   1) - Gastric, Body, Gastric for h. pylori rule out eosinophilic gastritis                           2) - Esophagus, Esophageal biopsy for EOE                                                           3) - Small Intestine, Duodenum, Rule out eosinophilic duodenitis                          • Final Diagnosis 12/04/2021    Final                    Value:This result contains rich text formatting which cannot be displayed here.      No radiology results for the last 90 days.  EGD 12/4/2021  - Normal oropharynx.  - Z-line regular, 40 cm from the incisors.  - Food in the lower third of the esophagus with distal esophageal stricture and narrowncaliber distal esophagus.  Removal was successful.  - Esophageal mucosal changes consistent with eosinophilic esophagitis. Biopsied.  - Localised esophagitis at food impaction site noted  - Erosive gastropathy with stigmata of recent bleeding. Biopsied.  - Normal duodenal bulb, first portion of the duodenum, second portion of the duodenum  and third portion of the  duodenum. Biopsied    EGD 1/11/2022  - Normal oropharynx.  - Z-line regular, 38 cm from the incisors.  - Esophageal mucosal changes consistent with eosinophilic esophagitis with narrow caliber esophagus. Biopsied  to to assess for PPI responsive EoE. Dilated.  - Normal cardia, gastric fundus, gastric body, incisura, antrum and prepyloric region of the stomach.  - Normal duodenal bulb and first portion of the duodenum.  Assessment / Plan      1.  Eosinophilic esophagitis  2.  Chronic esophageal dysphagia  3.  History of esophageal food bolus impaction  4.  Distal esophageal stricture  2/22/2022  He was doing well since his last EGD and savory dilatation up to 14 mm on 1/11/2022, however started noticing some retrosternal chest discomfort and occasional dysphagia to solids since last few weeks.  His initial biopsies revealed eosinophils more than 40 per high-power field that has come down to 24 high-power field with repeat EGD done on 1/11/2022.  He had a food allergy profile done but I do not have a copy of the report now.  His gastric biopsies were negative for H. pylori.  Advised to avoid food with any moderate and severe allergy.   We will reduce the PPI Protonix to 40 m p.o. daily  We will add budesonide slurry 1 mg p.o. twice daily for 3 months followed by 1 mg p.o. daily thereafter  We will reassess in 3 months time and consider repeat EGD with possible balloon dilatation of the distal esophagus  Ensure 1 can p.o. twice daily    12/4/2021  Patient's history is more suggestive of eosinophilic esophagitis with a stricture.. He had multiple episodes of food bolus in the past however he was able to pass down without any endoscopy.  This time he feels that food is not going down.  He does have some family history of dysphagia especially with a grandfather but no diagnosed achalasia or EOE in the family.  Patient was initially seen at [facility] emergency room and transferred  here for further management.            Follow Up:   No follow-ups on file.    Kolby Guaman MD  [facility] [place]  2/22/2022  15:45 EST     Please note that portions of this note may have been completed with a voice recognition program.

## 2022-02-23 ENCOUNTER — TELEPHONE (OUTPATIENT)
Dept: GASTROENTEROLOGY | Facility: CLINIC | Age: 27
End: 2022-02-23

## 2022-02-23 NOTE — TELEPHONE ENCOUNTER
Spoke with patient's mother.  Information given.  Briefly discussed the progress note with patient's mother since she was not at yesterday's appt.   She will give the message to the patient.  She is helping him with the medication he is on.

## 2022-02-23 NOTE — TELEPHONE ENCOUNTER
----- Message from Kolby Guaman MD sent at 2/22/2022  5:41 PM EST -----    Let him know to take Ensure or any protein shakes 1 can in the morning and 1 can in the evening to improve his nutrition

## 2022-03-10 ENCOUNTER — TRANSCRIBE ORDERS (OUTPATIENT)
Dept: ADMINISTRATIVE | Facility: HOSPITAL | Age: 27
End: 2022-03-10

## 2022-03-10 DIAGNOSIS — R10.9 LEFT FLANK PAIN: Primary | ICD-10-CM

## 2022-03-11 ENCOUNTER — TELEPHONE (OUTPATIENT)
Dept: GASTROENTEROLOGY | Facility: CLINIC | Age: 27
End: 2022-03-11

## 2022-03-11 NOTE — TELEPHONE ENCOUNTER
----- Message from Elke Turner MA sent at 3/11/2022  9:47 AM EST -----  Patient called today about his Budesonide for EOE. He said he was on his last dose but pharmacy told him he still had 13 days before he could pick it up. I called and talked to the pharmacy. Apparently they only gave him enough for one dose daily instead of the two doses he is supposed to have because the insurance needs a PA for the two doses. Can you submit PA and notify the pharmacy and patient once it's approved     [phone] [facility]

## 2022-03-15 ENCOUNTER — HOSPITAL ENCOUNTER (OUTPATIENT)
Dept: CT IMAGING | Facility: HOSPITAL | Age: 27
Discharge: HOME | End: 2022-03-15
Admitting: FAMILY MEDICINE

## 2022-03-15 ENCOUNTER — TELEPHONE (OUTPATIENT)
Dept: GASTROENTEROLOGY | Facility: CLINIC | Age: 27
End: 2022-03-15

## 2022-03-15 DIAGNOSIS — R10.9 LEFT FLANK PAIN: ICD-10-CM

## 2022-03-15 PROCEDURE — 74176 CT ABD & PELVIS W/O CONTRAST: CPT

## 2022-04-06 RX ORDER — BUDESONIDE 1 MG/2ML
1 INHALANT ORAL
Qty: 30 EACH | Refills: 5 | Status: SHIPPED | OUTPATIENT
Start: 2022-04-06 | End: 2022-05-23 | Stop reason: SDUPTHER

## 2022-05-23 ENCOUNTER — OFFICE VISIT (OUTPATIENT)
Dept: GASTROENTEROLOGY | Facility: CLINIC | Age: 27
End: 2022-05-23

## 2022-05-23 VITALS
HEIGHT: 72 IN | DIASTOLIC BLOOD PRESSURE: 60 MMHG | BODY MASS INDEX: 18.69 KG/M2 | WEIGHT: 138 LBS | SYSTOLIC BLOOD PRESSURE: 100 MMHG

## 2022-05-23 DIAGNOSIS — Z87.19 HISTORY OF ESOPHAGEAL STRICTURE: ICD-10-CM

## 2022-05-23 DIAGNOSIS — R13.19 ESOPHAGEAL DYSPHAGIA: ICD-10-CM

## 2022-05-23 DIAGNOSIS — K20.0 ESOPHAGITIS, EOSINOPHILIC: Primary | ICD-10-CM

## 2022-05-23 PROCEDURE — 99213 OFFICE O/P EST LOW 20 MIN: CPT | Performed by: INTERNAL MEDICINE

## 2022-05-23 RX ORDER — BUDESONIDE 1 MG/2ML
1 INHALANT ORAL
Qty: 90 EACH | Refills: 3 | Status: SHIPPED | OUTPATIENT
Start: 2022-05-23 | End: 2022-12-13 | Stop reason: SDUPTHER

## 2022-05-23 NOTE — PROGRESS NOTES
Follow Up Note     Date: 2022   Patient Name: Allan Burton  MRN: [id number]  : 1995     Referring Physician: Melissa Jolly DO    Chief Complaint:    Chief Complaint   Patient presents with   • Follow-up   • Difficulty Swallowing       Interval History:   2022  Allan Burton is a 26 y.o. male who is here today for follow up for his dysphagia.  He states that he is doing well now without any significant dysphagia.  He gained few pounds recently.  Denies any choking episodes.  No abdominal pain.  He did have an episode of left-sided chest pain and left-sided abdominal pain for which he had a recent CT scan done which was normal    2021  This is 26-year-old young male patient with a prior history of esophageal dysphagia prior history of esophageal food bolus was brought into emergency room at [facility] and subsequently transferred here at [facility] for further management of his food bolus in the esophagus.     He states that he had a chicken roll in the evening for his dinner following which he felt that chicken piece was stuck in the throat and he was not able to swallow any saliva.  He had a similar episodes multiple times in the past and was able to ultimately swallow the food bolus with manipulation and drinking water.  Time he tried to drink water but he could not swallow anything and vomited out.  Also felt little chest discomfort with shortness of breathing shortly that resolved thereafter.  Denies any abdominal pain.  He denies any prior history of EGD.  Denies any medications no significant reflux symptoms.  He states that his grandfather had issues with swallowing.    Subjective      Past Medical History:   Past Medical History:   Diagnosis Date   • Disease of thyroid gland    • Jaundice     as a baby   • Murmur    • Seasonal allergies    • Skin cancer     Face     Past Surgical History:   Past Surgical History:   Procedure Laterality Date   •  ENDOSCOPY     • ENDOSCOPY N/A 1/11/2022    Procedure: ESOPHAGOGASTRODUODENOSCOPY WITH DILATATION;  Surgeon: Kolby Guaman MD;  Location: [facility] ENDOSCOPY;  Service: Gastroenterology;  Laterality: N/A;   • ENDOSCOPY WITH FOREIGN BODY REMOVAL N/A 12/4/2021    Procedure: ESOPHAGOGASTRODUODENOSCOPY WITH FOREIGN BODY REMOVAL and biopsy;  Surgeon: Kolby Guaman MD;  Location: [facility] ENDOSCOPY;  Service: Gastroenterology;  Laterality: N/A;   • SKIN CANCER EXCISION  2014   • WISDOM TOOTH EXTRACTION         Family History: History reviewed. No pertinent family history.    Social History:   Social History     Socioeconomic History   • Marital status: Single   Tobacco Use   • Smoking status: Never Smoker   • Smokeless tobacco: Never Used   Vaping Use   • Vaping Use: Never used   Substance and Sexual Activity   • Alcohol use: Never   • Drug use: Never   • Sexual activity: Defer       Medications:     Current Outpatient Medications:   •  budesonide (Pulmicort) 1 MG/2ML nebulizer solution, Take 2 mL by nebulization Daily. Mix the solution in honey and swallow daily- Follow the office instructions, Disp: 30 each, Rfl: 5  •  levocetirizine (XYZAL) 5 MG tablet, Take 5 mg by mouth Every Evening., Disp: , Rfl:   •  levothyroxine (SYNTHROID, LEVOTHROID) 125 MCG tablet, Take 125 mcg by mouth Daily., Disp: , Rfl:   •  montelukast (SINGULAIR) 10 MG tablet, Take 10 mg by mouth Every Night., Disp: , Rfl:   •  Multiple Vitamins-Minerals (EQ MULTIVITAMINS ADULT GUMMY PO), Take  by mouth., Disp: , Rfl:   •  pantoprazole (PROTONIX) 40 MG EC tablet, Take 1 tablet by mouth Daily., Disp: 90 tablet, Rfl: 3  •  vitamin B-12 (CYANOCOBALAMIN) 1000 MCG tablet, Take 1,000 mcg by mouth Daily., Disp: , Rfl:   •  vitamin C (ASCORBIC ACID) 250 MG tablet, Take 250 mg by mouth Daily., Disp: , Rfl:   •  vitamin D (ERGOCALCIFEROL) 1.25 MG (86462 UT) capsule capsule, Take 50,000 Units by mouth 1 (One) Time Per Week., Disp: , Rfl:   •   "ELDERBERRY PO, Take  by mouth., Disp: , Rfl:     Allergies:   No Known Allergies    Review of Systems:   Review of Systems   Constitutional: Negative for appetite change, fatigue, fever and unexpected weight loss.   HENT: Negative for trouble swallowing.    Gastrointestinal: Negative for abdominal distention, abdominal pain, anal bleeding, blood in stool, constipation, diarrhea, nausea, rectal pain, vomiting, GERD and indigestion.       The following portions of the patient's history were reviewed and updated as appropriate: allergies, current medications, past family history, past medical history, past social history, past surgical history and problem list.    Objective     Physical Exam:  Vital Signs:   Vitals:    05/23/22 1600   BP: 100/60   Weight: 62.6 kg (138 lb)   Height: 182.9 cm (72\")       Physical Exam  Constitutional:       Comments: Is poorly built   HENT:      Head: Normocephalic and atraumatic.   Eyes:      Conjunctiva/sclera: Conjunctivae normal.   Abdominal:      General: Abdomen is flat. There is no distension.      Palpations: There is no mass.      Tenderness: There is no abdominal tenderness. There is no guarding or rebound.      Hernia: No hernia is present.   Musculoskeletal:      Cervical back: Normal range of motion and neck supple.   Neurological:      Mental Status: He is alert.         Results Review:   I reviewed the patient's new clinical results.    No visits with results within 90 Day(s) from this visit.   Latest known visit with results is:   Admission on 01/11/2022, Discharged on 01/11/2022   Component Date Value Ref Range Status   • Case Report 01/11/2022    Final                    Value:Surgical Pathology Report                         Case: [id number]                                  Authorizing Provider:  Kolby Guaman MD  Collected:           01/11/2022 02:00 PM          Ordering Location:     [facility]    Received:            01/12/2022 09:35 AM        "                          S[facility] ENDO                                                                    Pathologist:           Twin Carrington MD                                                        Specimen:    Esophagus                                                                                 • Clinical Information 01/11/2022    Final                    Value:This result contains rich text formatting which cannot be displayed here.   • Final Diagnosis 01/11/2022    Final                    Value:This result contains rich text formatting which cannot be displayed here.      CT Abdomen Pelvis Stone Protocol    Result Date: 3/15/2022  No hydronephrosis or nephrolithiasis.     259.58 mGy.cm. 5.55 mGy  This study was performed with techniques to keep radiation doses as low as reasonably achievable (ALARA). Individualized dose reduction techniques using automated exposure control or adjustment of mA and/or kV according to the patient size were employed.  This report was signed and finalized on 3/15/2022 3:11 PM by Flakito Kuhn M.D..      No radiology results for the last 90 days.  EGD 12/4/2021  - Normal oropharynx.  - Z-line regular, 40 cm from the incisors.  - Food in the lower third of the esophagus with distal esophageal stricture and narrowncaliber distal esophagus.  Removal was successful.  - Esophageal mucosal changes consistent with eosinophilic esophagitis. Biopsied.  - Localised esophagitis at food impaction site noted  - Erosive gastropathy with stigmata of recent bleeding. Biopsied.  - Normal duodenal bulb, first portion of the duodenum, second portion of the duodenum and third portion of the  duodenum. Biopsied     EGD 1/11/2022  - Normal oropharynx.  - Z-line regular, 38 cm from the incisors.  - Esophageal mucosal changes consistent with eosinophilic esophagitis with narrow caliber esophagus. Biopsied  to to assess for PPI responsive EoE. Dilated.  - Normal cardia, gastric fundus, gastric  body, incisura, antrum and prepyloric region of the stomach.  - Normal duodenal bulb and first portion of the duodenum.  Assessment / Plan      1.  Eosinophilic esophagitis  2.  Chronic esophageal dysphagia  3.  History of esophageal food bolus impaction  4.  Distal esophageal stricture  5/23/2022  His swallowing is much better now.  He is currently denying any significant dysphagia or choking sensation.  Reflux symptoms improved.  We will continue his PPI Protonix 40 mg p.o. daily along with budesonide dose will reduce to 1 mg neb solution p.o. daily.   Ensure 1 can p.o. twice daily    2/22/2022  He was doing well since his last EGD and savory dilatation up to 14 mm on 1/11/2022, however started noticing some retrosternal chest discomfort and occasional dysphagia to solids since last few weeks.  His initial biopsies revealed eosinophils more than 40 per high-power field that has come down to 24 high-power field with repeat EGD done on 1/11/2022.  He had a food allergy profile done but I do not have a copy of the report now.  His gastric biopsies were negative for H. pylori.  Advised to avoid food with any moderate and severe allergy.   We will reduce the PPI Protonix to 40 m p.o. daily       12/4/2021  Patient's history is more suggestive of eosinophilic esophagitis with a stricture.. He had multiple episodes of food bolus in the past however he was able to pass down without any endoscopy.  This time he feels that food is not going down.  He does have some family history of dysphagia especially with a grandfather but no diagnosed achalasia or EOE in the family.  Patient was initially seen at [facility] emergency room and transferred here for further management.          Follow Up:   No follow-ups on file.    Kolby Guaman MD  [facility]  5/23/2022  16:07 EDT     Please note that portions of this note may have been completed with a voice recognition program.

## 2022-12-14 RX ORDER — BUDESONIDE 1 MG/2ML
INHALANT ORAL
Qty: 90 EACH | Refills: 3 | Status: SHIPPED | OUTPATIENT
Start: 2022-12-14

## 2023-01-29 ENCOUNTER — APPOINTMENT (OUTPATIENT)
Dept: CT IMAGING | Facility: HOSPITAL | Age: 28
End: 2023-01-29
Payer: COMMERCIAL

## 2023-01-29 ENCOUNTER — HOSPITAL ENCOUNTER (EMERGENCY)
Facility: HOSPITAL | Age: 28
Discharge: HOME | End: 2023-01-30
Attending: HOSPITALIST
Payer: COMMERCIAL

## 2023-01-29 DIAGNOSIS — R10.9 LEFT FLANK PAIN: Primary | ICD-10-CM

## 2023-01-29 DIAGNOSIS — R11.2 NAUSEA AND VOMITING, UNSPECIFIED VOMITING TYPE: ICD-10-CM

## 2023-01-29 LAB
A/G RATIO: 1.9 (ref 0.8–2)
ALBUMIN SERPL-MCNC: 5 G/DL (ref 3.4–4.8)
ALP BLD-CCNC: 45 U/L (ref 25–100)
ALT SERPL-CCNC: 22 U/L (ref 4–36)
ANION GAP SERPL CALCULATED.3IONS-SCNC: 16 MMOL/L (ref 3–16)
AST SERPL-CCNC: 23 U/L (ref 8–33)
BASOPHILS ABSOLUTE: 0 K/UL (ref 0–0.1)
BASOPHILS RELATIVE PERCENT: 0.3 %
BILIRUB SERPL-MCNC: 1 MG/DL (ref 0.3–1.2)
BUN BLDV-MCNC: 14 MG/DL (ref 6–20)
CALCIUM SERPL-MCNC: 9.7 MG/DL (ref 8.5–10.5)
CHLORIDE BLD-SCNC: 99 MMOL/L (ref 98–107)
CO2: 23 MMOL/L (ref 20–30)
CREAT SERPL-MCNC: 0.8 MG/DL (ref 0.4–1.2)
EOSINOPHILS ABSOLUTE: 0 K/UL (ref 0–0.4)
EOSINOPHILS RELATIVE PERCENT: 0 %
GFR SERPL CREATININE-BSD FRML MDRD: >60 ML/MIN/{1.73_M2}
GLOBULIN: 2.6 G/DL
GLUCOSE BLD-MCNC: 177 MG/DL (ref 74–106)
HCT VFR BLD CALC: 45.6 % (ref 40–54)
HEMOGLOBIN: 15.8 G/DL (ref 13–18)
IMMATURE GRANULOCYTES #: 0 K/UL
IMMATURE GRANULOCYTES %: 0.3 % (ref 0–5)
LYMPHOCYTES ABSOLUTE: 0.2 K/UL (ref 1.5–4)
LYMPHOCYTES RELATIVE PERCENT: 1.7 %
MCH RBC QN AUTO: 29.2 PG (ref 27–32)
MCHC RBC AUTO-ENTMCNC: 34.6 G/DL (ref 31–35)
MCV RBC AUTO: 84.3 FL (ref 80–100)
MONOCYTES ABSOLUTE: 0.8 K/UL (ref 0.2–0.8)
MONOCYTES RELATIVE PERCENT: 5.8 %
NEUTROPHILS ABSOLUTE: 12.5 K/UL (ref 2–7.5)
NEUTROPHILS RELATIVE PERCENT: 91.9 %
PDW BLD-RTO: 12.3 % (ref 11–16)
PLATELET # BLD: 275 K/UL (ref 150–400)
PMV BLD AUTO: 8.5 FL (ref 6–10)
POTASSIUM REFLEX MAGNESIUM: 4.1 MMOL/L (ref 3.4–5.1)
RBC # BLD: 5.41 M/UL (ref 4.5–6)
SARS-COV-2, NAAT: NOT DETECTED
SODIUM BLD-SCNC: 138 MMOL/L (ref 136–145)
TOTAL PROTEIN: 7.6 G/DL (ref 6.4–8.3)
WBC # BLD: 13.5 K/UL (ref 4–11)

## 2023-01-29 PROCEDURE — 6360000002 HC RX W HCPCS: Performed by: HOSPITALIST

## 2023-01-29 PROCEDURE — 96376 TX/PRO/DX INJ SAME DRUG ADON: CPT

## 2023-01-29 PROCEDURE — 80053 COMPREHEN METABOLIC PANEL: CPT

## 2023-01-29 PROCEDURE — 74176 CT ABD & PELVIS W/O CONTRAST: CPT

## 2023-01-29 PROCEDURE — 96375 TX/PRO/DX INJ NEW DRUG ADDON: CPT

## 2023-01-29 PROCEDURE — 87635 SARS-COV-2 COVID-19 AMP PRB: CPT

## 2023-01-29 PROCEDURE — 2580000003 HC RX 258: Performed by: HOSPITALIST

## 2023-01-29 PROCEDURE — 36415 COLL VENOUS BLD VENIPUNCTURE: CPT

## 2023-01-29 PROCEDURE — 96374 THER/PROPH/DIAG INJ IV PUSH: CPT

## 2023-01-29 PROCEDURE — 85025 COMPLETE CBC W/AUTO DIFF WBC: CPT

## 2023-01-29 PROCEDURE — 99284 EMERGENCY DEPT VISIT MOD MDM: CPT

## 2023-01-29 RX ORDER — 0.9 % SODIUM CHLORIDE 0.9 %
1000 INTRAVENOUS SOLUTION INTRAVENOUS ONCE
Status: COMPLETED | OUTPATIENT
Start: 2023-01-29 | End: 2023-01-29

## 2023-01-29 RX ORDER — MORPHINE SULFATE 2 MG/ML
2 INJECTION, SOLUTION INTRAMUSCULAR; INTRAVENOUS EVERY 30 MIN PRN
Status: DISCONTINUED | OUTPATIENT
Start: 2023-01-29 | End: 2023-01-29

## 2023-01-29 RX ORDER — ONDANSETRON 2 MG/ML
4 INJECTION INTRAMUSCULAR; INTRAVENOUS ONCE
Status: COMPLETED | OUTPATIENT
Start: 2023-01-29 | End: 2023-01-29

## 2023-01-29 RX ORDER — KETOROLAC TROMETHAMINE 30 MG/ML
30 INJECTION, SOLUTION INTRAMUSCULAR; INTRAVENOUS ONCE
Status: COMPLETED | OUTPATIENT
Start: 2023-01-29 | End: 2023-01-29

## 2023-01-29 RX ORDER — LEVOCETIRIZINE DIHYDROCHLORIDE 5 MG/1
5 TABLET, FILM COATED ORAL NIGHTLY
COMMUNITY

## 2023-01-29 RX ADMIN — SODIUM CHLORIDE 1000 ML: 9 INJECTION, SOLUTION INTRAVENOUS at 21:18

## 2023-01-29 RX ADMIN — ONDANSETRON 4 MG: 2 INJECTION INTRAMUSCULAR; INTRAVENOUS at 21:19

## 2023-01-29 RX ADMIN — SODIUM CHLORIDE 1000 ML: 9 INJECTION, SOLUTION INTRAVENOUS at 22:47

## 2023-01-29 RX ADMIN — HYDROMORPHONE HYDROCHLORIDE 1 MG: 1 INJECTION, SOLUTION INTRAMUSCULAR; INTRAVENOUS; SUBCUTANEOUS at 23:04

## 2023-01-29 RX ADMIN — KETOROLAC TROMETHAMINE 30 MG: 30 INJECTION, SOLUTION INTRAMUSCULAR; INTRAVENOUS at 21:21

## 2023-01-29 RX ADMIN — MORPHINE SULFATE 2 MG: 2 INJECTION, SOLUTION INTRAMUSCULAR; INTRAVENOUS at 21:18

## 2023-01-29 RX ADMIN — HYDROMORPHONE HYDROCHLORIDE 1 MG: 1 INJECTION, SOLUTION INTRAMUSCULAR; INTRAVENOUS; SUBCUTANEOUS at 21:38

## 2023-01-29 ASSESSMENT — PAIN SCALES - GENERAL: PAINLEVEL_OUTOF10: 10

## 2023-01-29 NOTE — Clinical Note
Edy Antony was seen and treated in our emergency department on 1/29/2023. He may return to work on 01/31/2023. If you have any questions or concerns, please don't hesitate to call.       Randall Avila, DO

## 2023-01-30 VITALS
SYSTOLIC BLOOD PRESSURE: 106 MMHG | WEIGHT: 130 LBS | BODY MASS INDEX: 17.61 KG/M2 | RESPIRATION RATE: 22 BRPM | HEIGHT: 72 IN | DIASTOLIC BLOOD PRESSURE: 53 MMHG | TEMPERATURE: 99.3 F | HEART RATE: 106 BPM | OXYGEN SATURATION: 100 %

## 2023-01-30 PROCEDURE — 6370000000 HC RX 637 (ALT 250 FOR IP): Performed by: HOSPITALIST

## 2023-01-30 PROCEDURE — 51798 US URINE CAPACITY MEASURE: CPT

## 2023-01-30 RX ORDER — ONDANSETRON 4 MG/1
4 TABLET, FILM COATED ORAL EVERY 4 HOURS PRN
Qty: 10 TABLET | Refills: 0 | Status: SHIPPED | OUTPATIENT
Start: 2023-01-30

## 2023-01-30 RX ORDER — IBUPROFEN 600 MG/1
600 TABLET ORAL EVERY 6 HOURS PRN
Qty: 20 TABLET | Refills: 0 | Status: SHIPPED | OUTPATIENT
Start: 2023-01-30 | End: 2023-03-01

## 2023-01-30 RX ORDER — ONDANSETRON 4 MG/1
4 TABLET, ORALLY DISINTEGRATING ORAL ONCE
Status: COMPLETED | OUTPATIENT
Start: 2023-01-30 | End: 2023-01-30

## 2023-01-30 RX ADMIN — ONDANSETRON 4 MG: 4 TABLET, ORALLY DISINTEGRATING ORAL at 01:43

## 2023-01-30 NOTE — ED NOTES
Patient off unit via wheelchair to CT scan at this time.       Kiara Webster, [address] [place] [address]  01/29/23 2413

## 2023-01-30 NOTE — ED NOTES
Discharge instructions provided to patient. PIV removed. Patient verbalizes understanding of d/c plan and follow up care. Patient asking to lie in bed for a few more minutes as his stomach was still having some discomfort/nausea.        [facility]  01/30/23 9482

## 2023-01-30 NOTE — ED PROVIDER NOTES
[facility] ENCOUNTER      Pt Name: Kiko Guy  MRN: [id number]  YOB: 1995  Date of evaluation: 1/29/2023  Provider: Obie Hood DO    CHIEF COMPLAINT       Chief Complaint   Patient presents with    Emesis    Abdominal Pain    Flank Pain     LEFT         HISTORY OF PRESENT ILLNESS  (Location/Symptom, Timing/Onset, Context/Setting, Quality, Duration, Modifying Factors, Severity.)   Kiko Guy is a 28 y.o. male who presents to the emergency department for nausea vomiting, abdominal pain/flank pain. Patient states his symptoms started around 10:00 this morning. Advised they have progressively worsened throughout the day. He has had nausea and vomiting with the symptoms. He states he is also had some dysuria and burning with urination but does not feel like he is urinating more or less than usual.  He is complaining of pain is kind of diffuse entire abdomen but more is now left flank and radiates down towards the testicular area. Patient describes it as a sharp sensation. He gives it a 10 out of 10 sensation. States nothing that he is done is made it any better or any worse today. States has been trying to drink fluids but because of the pain and the nausea vomiting is not been able to keep it down. He denies any trauma or injury to the area. Patient states he had similar episode once before and they thought he had a kidney stone but everything came back negative on a previous work-up. Patient writhing around on the bed and discomfort he is sort of pale looking. Denies any fevers or chills. Denies any earache. Denies any headache out of ordinary. Denies any sore throat. Denies any cough out of ordinary. Denies any chest pain or shortness of breath. Nursing notes were reviewed.     REVIEW OFSYSTEMS    (2-9 systems for level 4, 10 or more for level 5)   ROS:  General:  No fevers, no chills, no weakness  Cardiovascular:  No chest pain, no palpitations  Respiratory:  No shortness of breath, no cough, no wheezing  Gastrointestinal:  +pain-diffuse, + nausea, + vomiting, no diarrhea  Musculoskeletal:  No muscle pain, no joint pain, +left flank pain  Skin:  No rash, no easy bruising  Neurologic:  No speech problems, no headache, no extremity weakness  Psychiatric:  No anxiety  Genitourinary:  No dysuria, no hematuria    Except as noted above the remainder of the review of systems was reviewed and negative. PAST MEDICAL HISTORY     Past Medical History:   Diagnosis Date    Heart murmur          SURGICAL HISTORY       Past Surgical History:   Procedure Laterality Date    SKIN CANCER EXCISION           CURRENT MEDICATIONS       Previous Medications    ASCORBIC ACID (VITAMIN C PO)    Take by mouth    BUDESONIDE ER PO    Take by mouth Takes a solution and mixes with honey. Drinks to keep esophageal swelling down. LEVOCETIRIZINE (XYZAL) 5 MG TABLET    Take 5 mg by mouth nightly    LEVOTHYROXINE SODIUM PO    Take by mouth    MONTELUKAST SODIUM PO    Take by mouth       ALLERGIES     Patient has no known allergies. FAMILY HISTORY     No family history on file. SOCIAL HISTORY       Social History     Socioeconomic History    Marital status: Single   Tobacco Use    Smoking status: Never    Smokeless tobacco: Never   Substance and Sexual Activity    Alcohol use: No    Drug use: No         PHYSICAL EXAM    (up to 7 for level 4, 8 or more for level 5)     ED Triage Vitals [01/29/23 2036]   BP Temp Temp Source Heart Rate Resp SpO2 Height Weight   (!) 116/59 99.3 °F (37.4 °C) Oral (!) 106 22 100 % 6' (1.829 m) 130 lb (59 kg)       Physical Exam  General :Patient is awake, alert, oriented, mild distress secondary to pain, pale  HEENT: Pupils are equally round and reactive to light, EOMI, conjunctivae clear. Oral mucosa is moist, no exudate.  Uvula is midline  Cardiac: Heart regular rate, rhythm, no murmurs, rubs, or gallops  Lungs: Lungs are clear to auscultation, there is no wheezing, rhonchi, or rales. There is no use of accessory muscles. Abdomen: Abdomen is soft, mild diffuse tenderness to the entire abdomen but seems more located to the left side, nondistended. There is no firm or pulsatile masses, no rebound rigidity or guarding. Positive Balta sign on the left negative on the right. Musculoskeletal: 5 out of 5 strength in all 4 extremities. No focal muscle deficits are appreciated  Neuro: Motor intact, sensory intact, level of consciousness is normal  Dermatology: Skin is warm and dry  Psych: Mentation is grossly normal, cognition is grossly normal. Affect is appropriate. DIAGNOSTIC RESULTS     EKG: All EKG's are interpreted by the Emergency Department Physician who either signs or Co-signs this chart in the [address] a cardiologist.        RADIOLOGY:   Non-plain film images such as CT, Ultrasound and MRI are read by the radiologist. Plain radiographic images are visualized and preliminarily interpreted by the emergency physician with the below findings:      [] Radiologist's Report Reviewed:  CT ABDOMEN PELVIS WO CONTRAST Additional Contrast? None   Final Result   1. Hepatomegaly   2.  No acute intra-abdominal or pelvic abnormalities             ED BEDSIDE ULTRASOUND:   Performed by ED Physician - none    LABS:    I have reviewed and interpreted all of the currently available lab results from this visit (ifapplicable):  Results for orders placed or performed during the hospital encounter of 01/29/23   COVID-19, Rapid    Specimen: Nasopharyngeal Swab   Result Value Ref Range    SARS-CoV-2, NAAT Not Detected Not Detected   CBC with Auto Differential   Result Value Ref Range    WBC 13.5 (H) 4.0 - 11.0 K/uL    RBC 5.41 4.50 - 6.00 M/uL    Hemoglobin 15.8 13.0 - 18.0 g/dL    Hematocrit 45.6 40.0 - 54.0 %    MCV 84.3 80.0 - 100.0 fL    MCH 29.2 27.0 - 32.0 pg    MCHC 34.6 31.0 - 35.0 g/dL    RDW 12.3 11.0 - 16.0 %    Platelets 917 535 - 400 K/uL    MPV 8.5 6.0 - 10.0 fL    Neutrophils % 91.9 %    Immature Granulocytes % 0.3 0.0 - 5.0 %    Lymphocytes % 1.7 %    Monocytes % 5.8 %    Eosinophils % 0.0 %    Basophils % 0.3 %    Neutrophils Absolute 12.5 (H) 2.0 - 7.5 K/uL    Immature Granulocytes # 0.0 K/uL    Lymphocytes Absolute 0.2 (L) 1.5 - 4.0 K/uL    Monocytes Absolute 0.8 0.2 - 0.8 K/uL    Eosinophils Absolute 0.0 0.0 - 0.4 K/uL    Basophils Absolute 0.0 0.0 - 0.1 K/uL   Comprehensive Metabolic Panel w/ Reflex to MG   Result Value Ref Range    Sodium 138 136 - 145 mmol/L    Potassium reflex Magnesium 4.1 3.4 - 5.1 mmol/L    Chloride 99 98 - 107 mmol/L    CO2 23 20 - 30 mmol/L    Anion Gap 16 3 - 16    Glucose 177 (H) 74 - 106 mg/dL    BUN 14 6 - 20 mg/dL    Creatinine 0.8 0.4 - 1.2 mg/dL    Est, Glom Filt Rate >60 >59    Calcium 9.7 8.5 - 10.5 mg/dL    Total Protein 7.6 6.4 - 8.3 g/dL    Albumin 5.0 (H) 3.4 - 4.8 g/dL    Albumin/Globulin Ratio 1.9 0.8 - 2.0    Total Bilirubin 1.0 0.3 - 1.2 mg/dL    Alkaline Phosphatase 45 25 - 100 U/L    ALT 22 4 - 36 U/L    AST 23 8 - 33 U/L    Globulin 2.6 Not Established g/dL        All other labs were within normal range or not returned as of this dictation.     EMERGENCY DEPARTMENT COURSE and DIFFERENTIAL DIAGNOSIS/MDM:   Vitals:    Vitals:    01/29/23 2215 01/29/23 2230 01/29/23 2245 01/29/23 2300   BP:       Pulse:       Resp:       Temp:       TempSrc:       SpO2: 100% 100% 100% 100%   Weight:       Height:           MEDICATIONS ADMINISTERED IN ED:  Medications   HYDROmorphone (DILAUDID) injection 1 mg (1 mg IntraVENous Given 1/29/23 2304)   0.9 % sodium chloride bolus (0 mLs IntraVENous Stopped 1/29/23 2240)   ketorolac (TORADOL) injection 30 mg (30 mg IntraVENous Given 1/29/23 2121)   ondansetron (ZOFRAN) injection 4 mg (4 mg IntraVENous Given 1/29/23 2119)   0.9 % sodium chloride bolus (0 mLs IntraVENous Stopped 1/29/23 1239)       Initial evaluation examination I did have conversation with the patient about upcoming plan, treatment possible disposition which they are agreeable to the times dictation. Advised that that we going give a fluid bolus normal saline 1 L. Patient be given Toradol and Zofran here for pain and nausea control if that does not help we will order him for some as needed morphine for pain control. Patient have CT scan abdomen pelvis without contrast for evaluation of his abdominal discomfort/flank pain to make sure he does not have any renal colic versus other etiology such as bowel. Patient will also have a CBC, CMP and a UA performed. Patient's final disposition will be determined once his radiological diagnostic studies been performed reviewed. The Toradol and morphine really did not help the patient's pain much. We will go ahead and switch the morphine to Dilaudid to see if we get better pain control. Rapid COVID screen was negative per patient's request to have 1 performed    Blood work showed white count 13,500, hemoglobin is 15.8, hematocrit 45.6, platelet count is 970. Comprehensive metabolic panel was benign except for glucose 177 mildly elevated. UA pending because patient was unable to provide sample. Bladder scan was performed only had 40 mL of urine in the bladder patient is not obstructed. CT scan Abdo pelvis without contrast read by radiology as hepatomegaly. No acute intra-abdominal or pelvic abnormalities. Patient's radiological diagnostic studies were discussed with him and he does state his understanding. Patient vies of his findings so far. He is still not been able to urinate but he is in the process now trying to give us a sample. Advised CT scan was negative per radiology read there is some stool in the distal colon. Advised that his white count was just barely elevated at 13,500 very nonspecific. Patient's pain is better controlled now with the Dilaudid.   Patient is requesting to be checked for COVID at this time he states that his only thing that he could determine that when he had it once before that was similar to the body aches that he was having with it. Patient advised though that we would just make sure that he does not have a urinary tract infection and if not then I do believe he stable to be discharged home in stable condition. We will write him for some high-dose anti-inflammatory medication and some nausea medicine. We will give him information for a local urologist follow-up within 1 week if his symptoms persist.  He was just concerned because he has had similar symptoms of this before and his work-up was also benign at that time. Patient states he does pretty well with pain but his pain was so bad he just did not know what else to do so he came here for evaluation and told him that was appropriate. The patient advised he does need to follow-up with his regular family physician within the next 1 to 2 days for evaluation. He was also given instructions if symptoms worsens or new symptoms arise he should return back to emergency department for further evaluation work-up. Is this patient to be included in the SEP-1 Core Measure due to severe sepsis or septic shock? No   Exclusion criteria - the patient is NOT to be included for SEP-1 Core Measure due to: Infection is not suspected          CONSULTS:  None    PROCEDURES:  Procedures    CRITICAL CARE TIME    Total Critical Care time was 0 minutes, excluding separately reportable procedures. There was a high probability of clinically significant/life threatening deterioration in the patient's condition which required my urgent intervention. FINAL IMPRESSION      1. Left flank pain    2.  Nausea and vomiting, unspecified vomiting type          DISPOSITION/PLAN   DISPOSITION Decision To Discharge 01/30/2023 12:50:49 AM      PATIENT REFERRED TO:  RASTA Chapin - CNP   [facility] [address]  [phone]    In 2 days      [facility] Emergency Department  [address] [facility]  [phone]    As needed, If symptoms worsen    Ariadna Metz MD  [address]  [facility]  [phone]    Schedule an appointment as soon as possible for a visit in 1 week  If symptoms worsen or does not improve    DISCHARGE MEDICATIONS:  New Prescriptions    IBUPROFEN (IBU) 600 MG TABLET    Take 1 tablet by mouth every 6 hours as needed for Pain    ONDANSETRON (ZOFRAN) 4 MG TABLET    Take 1 tablet by mouth every 4 hours as needed for Nausea or Vomiting       Comment: Please note this report has been produced using speech recognition software and may contain errorsrelated to that system including errors in grammar, punctuation, and spelling, as well as words and phrases that may be inappropriate. If there are any questions or concerns please feel free to contact the dictating providerfor clarification.     Lalitha Grace DO  Attending Emergency Physician               Lalitha Grace DO  01/30/23 7185

## 2023-04-04 ENCOUNTER — HOSPITAL ENCOUNTER (EMERGENCY)
Facility: HOSPITAL | Age: 28
Discharge: HOME | End: 2023-04-05
Attending: FAMILY MEDICINE
Payer: COMMERCIAL

## 2023-04-04 DIAGNOSIS — R10.13 ABDOMINAL PAIN, EPIGASTRIC: Primary | ICD-10-CM

## 2023-04-04 DIAGNOSIS — Z87.09 HISTORY OF STREP PHARYNGITIS: ICD-10-CM

## 2023-04-04 ASSESSMENT — PAIN - FUNCTIONAL ASSESSMENT: PAIN_FUNCTIONAL_ASSESSMENT: NONE - DENIES PAIN

## 2023-04-04 NOTE — Clinical Note
Oni Aguayo was seen and treated in our emergency department on 4/4/2023. He may return to work on 04/06/2023. If you have any questions or concerns, please don't hesitate to call.       Kaylah Keen, DO

## 2023-04-05 VITALS
RESPIRATION RATE: 16 BRPM | HEIGHT: 72 IN | OXYGEN SATURATION: 100 % | BODY MASS INDEX: 17.88 KG/M2 | WEIGHT: 132 LBS | TEMPERATURE: 98.4 F | SYSTOLIC BLOOD PRESSURE: 97 MMHG | DIASTOLIC BLOOD PRESSURE: 59 MMHG | HEART RATE: 58 BPM

## 2023-04-05 LAB
BACTERIA URNS QL MICRO: ABNORMAL /HPF
BILIRUB UR QL STRIP.AUTO: NEGATIVE
CLARITY UR: CLEAR
COLOR UR: YELLOW
EPI CELLS #/AREA URNS HPF: ABNORMAL /HPF (ref 0–5)
FINE GRAN CASTS #/AREA URNS HPF: ABNORMAL /LPF (ref 0–2)
GLUCOSE UR STRIP.AUTO-MCNC: NEGATIVE MG/DL
HGB UR QL STRIP.AUTO: ABNORMAL
KETONES UR STRIP.AUTO-MCNC: 40 MG/DL
LEUKOCYTE ESTERASE UR QL STRIP.AUTO: NEGATIVE
MUCOUS THREADS URNS QL MICRO: ABNORMAL /LPF
NITRITE UR QL STRIP.AUTO: NEGATIVE
PH UR STRIP.AUTO: 6.5 [PH] (ref 5–8)
PROT UR STRIP.AUTO-MCNC: NEGATIVE MG/DL
RBC #/AREA URNS HPF: ABNORMAL /HPF (ref 0–4)
SP GR UR STRIP.AUTO: 1.02 (ref 1–1.03)
UA COMPLETE W REFLEX CULTURE PNL UR: ABNORMAL
UA DIPSTICK W REFLEX MICRO PNL UR: YES
URN SPEC COLLECT METH UR: ABNORMAL
UROBILINOGEN UR STRIP-ACNC: 0.2 E.U./DL
WBC #/AREA URNS HPF: ABNORMAL /HPF (ref 0–5)

## 2023-04-05 PROCEDURE — 81001 URINALYSIS AUTO W/SCOPE: CPT

## 2023-04-05 PROCEDURE — 6370000000 HC RX 637 (ALT 250 FOR IP): Performed by: FAMILY MEDICINE

## 2023-04-05 RX ORDER — ONDANSETRON 4 MG/1
15 TABLET, ORALLY DISINTEGRATING ORAL 3 TIMES DAILY PRN
Qty: 21 TABLET | Refills: 0 | Status: SHIPPED | OUTPATIENT
Start: 2023-04-05

## 2023-04-05 RX ADMIN — ALUMINUM HYDROXIDE, MAGNESIUM HYDROXIDE, AND SIMETHICONE: 200; 200; 20 SUSPENSION ORAL at 00:39

## 2023-04-05 ASSESSMENT — ENCOUNTER SYMPTOMS
NAUSEA: 1
ABDOMINAL PAIN: 1
DIARRHEA: 1
VOMITING: 1

## 2023-04-05 NOTE — ED PROVIDER NOTES
occasionally words are mis-transcribed.)    Eliana Gerardo DO (electronically signed)            Eliana Gerardo DO  04/05/23 2742

## 2023-04-05 NOTE — PROGRESS NOTES
[facility] called to [facility] zofran RX. Dr Ryan Mcguire stated That RX should have been zofran 4mg tid prn N/V. [facility] notified.

## 2023-05-22 ENCOUNTER — OFFICE VISIT (OUTPATIENT)
Dept: GASTROENTEROLOGY | Facility: CLINIC | Age: 28
End: 2023-05-22
Payer: COMMERCIAL

## 2023-05-22 VITALS
HEIGHT: 72 IN | TEMPERATURE: 98.6 F | DIASTOLIC BLOOD PRESSURE: 56 MMHG | WEIGHT: 134 LBS | BODY MASS INDEX: 18.15 KG/M2 | SYSTOLIC BLOOD PRESSURE: 114 MMHG | HEART RATE: 70 BPM

## 2023-05-22 DIAGNOSIS — R10.32 LEFT LOWER QUADRANT ABDOMINAL PAIN: ICD-10-CM

## 2023-05-22 DIAGNOSIS — K20.0 ESOPHAGITIS, EOSINOPHILIC: Primary | ICD-10-CM

## 2023-05-22 DIAGNOSIS — Z87.19 HISTORY OF ESOPHAGEAL STRICTURE: ICD-10-CM

## 2023-05-22 DIAGNOSIS — R13.19 ESOPHAGEAL DYSPHAGIA: ICD-10-CM

## 2023-05-22 PROCEDURE — 99214 OFFICE O/P EST MOD 30 MIN: CPT | Performed by: INTERNAL MEDICINE

## 2023-05-22 RX ORDER — ONDANSETRON 4 MG/1
TABLET, ORALLY DISINTEGRATING ORAL AS NEEDED
COMMUNITY
Start: 2023-04-05

## 2023-05-22 RX ORDER — PANTOPRAZOLE SODIUM 40 MG/1
40 TABLET, DELAYED RELEASE ORAL DAILY
Qty: 90 TABLET | Refills: 3 | Status: SHIPPED | OUTPATIENT
Start: 2023-05-22

## 2023-05-22 RX ORDER — IBUPROFEN 800 MG/1
800 TABLET ORAL 3 TIMES DAILY PRN
COMMUNITY
Start: 2023-04-25

## 2023-05-22 RX ORDER — LORATADINE 10 MG/1
10 TABLET ORAL DAILY
COMMUNITY

## 2023-05-22 RX ORDER — DUPILUMAB 300 MG/2ML
300 INJECTION, SOLUTION SUBCUTANEOUS WEEKLY
Qty: 3 ML | Refills: 3
Start: 2023-05-22

## 2023-05-22 NOTE — PROGRESS NOTES
Follow Up Note     Date: 2023   Patient Name: Allan Burton  MRN: [id number]  : 1995     Referring Physician: Melissa Jolly DO    Chief Complaint:    Chief Complaint   Patient presents with   • EoE       Interval History:   2023  Allan Bruton is a 27 y.o. male who is here today for follow up for his EOE and dysphagia.  He states that he is doing pretty well however occasionally he still does get issues with the swallowing with solids.    2021  This is 26-year-old young male patient with a prior history of esophageal dysphagia prior history of esophageal food bolus was brought into emergency room at [facility] and subsequently transferred here at [facility] for further management of his food bolus in the esophagus.     He states that he had a chicken roll in the evening for his dinner following which he felt that chicken piece was stuck in the throat and he was not able to swallow any saliva.  He had a similar episodes multiple times in the past and was able to ultimately swallow the food bolus with manipulation and drinking water.  Time he tried to drink water but he could not swallow anything and vomited out.  Also felt little chest discomfort with shortness of breathing shortly that resolved thereafter.  Denies any abdominal pain.  He denies any prior history of EGD.  Denies any medications no significant reflux symptoms.  He states that his grandfather had issues with swallowing.  Subjective      Past Medical History:   Past Medical History:   Diagnosis Date   • Disease of thyroid gland    • Jaundice     as a baby   • Murmur    • Seasonal allergies    • Skin cancer     Face     Past Surgical History:   Past Surgical History:   Procedure Laterality Date   • ENDOSCOPY     • ENDOSCOPY N/A 2022    Procedure: ESOPHAGOGASTRODUODENOSCOPY WITH DILATATION;  Surgeon: Kolby Guaman MD;  Location: [facility] ENDOSCOPY;  Service: Gastroenterology;   Laterality: N/A;   • ENDOSCOPY WITH FOREIGN BODY REMOVAL N/A 12/4/2021    Procedure: ESOPHAGOGASTRODUODENOSCOPY WITH FOREIGN BODY REMOVAL and biopsy;  Surgeon: Kolby Guaman MD;  Location: [facility] ENDOSCOPY;  Service: Gastroenterology;  Laterality: N/A;   • SKIN CANCER EXCISION  2014   • WISDOM TOOTH EXTRACTION         Family History:   Family History   Problem Relation Age of Onset   • Colon cancer Neg Hx        Social History:   Social History     Socioeconomic History   • Marital status: Single   Tobacco Use   • Smoking status: Never   • Smokeless tobacco: Never   Vaping Use   • Vaping Use: Never used   Substance and Sexual Activity   • Alcohol use: Never   • Drug use: Never   • Sexual activity: Defer       Medications:     Current Outpatient Medications:   •  ALLERGY SERUM INJECTION, Inject  under the skin into the appropriate area as directed 1 (One) Time., Disp: , Rfl:   •  budesonide (Pulmicort) 1 MG/2ML nebulizer solution, Mix the solution in honey and swallow once daily. Follow the office instructions given at office., Disp: 90 each, Rfl: 3  •  ibuprofen (ADVIL,MOTRIN) 800 MG tablet, Take 1 tablet by mouth 3 (Three) Times a Day As Needed., Disp: , Rfl:   •  levothyroxine (SYNTHROID, LEVOTHROID) 50 MCG tablet, Take 1 tablet by mouth Daily., Disp: , Rfl:   •  loratadine (Claritin) 10 MG tablet, Take 1 tablet by mouth Daily., Disp: , Rfl:   •  montelukast (SINGULAIR) 10 MG tablet, Take 1 tablet by mouth Every Morning., Disp: , Rfl:   •  Multiple Vitamins-Minerals (EQ MULTIVITAMINS ADULT GUMMY PO), Take  by mouth., Disp: , Rfl:   •  ondansetron ODT (ZOFRAN-ODT) 4 MG disintegrating tablet, Take  by mouth As Needed., Disp: , Rfl:   •  pantoprazole (PROTONIX) 40 MG EC tablet, Take 1 tablet by mouth Daily., Disp: 90 tablet, Rfl: 3  •  Cholecalciferol (VITAMIN D3 GUMMIES PO), Take 2 tablets by mouth Daily. (Patient not taking: Reported on 5/22/2023), Disp: , Rfl:   •  ELDERBERRY PO, Take  by mouth., Disp: ,  "Rfl:   •  levocetirizine (XYZAL) 5 MG tablet, Take 1 tablet by mouth Every Evening. (Patient not taking: Reported on 5/22/2023), Disp: , Rfl:   •  vitamin B-12 (CYANOCOBALAMIN) 1000 MCG tablet, Take 1,000 mcg by mouth Daily. (Patient not taking: Reported on 5/22/2023), Disp: , Rfl:   •  vitamin C (ASCORBIC ACID) 250 MG tablet, Take 250 mg by mouth Daily. (Patient not taking: Reported on 5/22/2023), Disp: , Rfl:   •  vitamin D (ERGOCALCIFEROL) 1.25 MG (11303 UT) capsule capsule, Take 50,000 Units by mouth 1 (One) Time Per Week. (Patient not taking: Reported on 5/22/2023), Disp: , Rfl:     Allergies:   No Known Allergies    Review of Systems:   Review of Systems   Constitutional: Negative for appetite change, fatigue, fever and unexpected weight loss.   HENT: Negative for trouble swallowing.    Gastrointestinal: Positive for abdominal pain (only with sickness), anal bleeding (spot or two here and there) and GERD. Negative for abdominal distention, blood in stool, constipation, diarrhea, nausea, rectal pain, vomiting and indigestion.       The following portions of the patient's history were reviewed and updated as appropriate: allergies, current medications, past family history, past medical history, past social history, past surgical history and problem list.    Objective     Physical Exam:  Vital Signs:   Vitals:    05/22/23 1607   BP: 114/56   Pulse: 70   Temp: 98.6 °F (37 °C)   TempSrc: Infrared   Weight: 60.8 kg (134 lb)   Height: 182.9 cm (72\")  Comment: patient states       Physical Exam  Constitutional:       Comments: He is poorly built   HENT:      Head: Normocephalic and atraumatic.   Eyes:      Conjunctiva/sclera: Conjunctivae normal.   Abdominal:      General: Abdomen is flat. There is no distension.      Palpations: There is no mass.      Tenderness: There is no abdominal tenderness. There is no guarding or rebound.      Hernia: No hernia is present.   Musculoskeletal:      Cervical back: Normal range of " motion and neck supple.   Neurological:      Mental Status: He is alert.         Results Review:   I reviewed the patient's new clinical results.    No visits with results within 90 Day(s) from this visit.   Latest known visit with results is:   Admission on 01/11/2022, Discharged on 01/11/2022   Component Date Value Ref Range Status   • Case Report 01/11/2022    Final                    Value:Surgical Pathology Report                         Case: [id number]                                  Authorizing Provider:  Kolby Guaman MD  Collected:           01/11/2022 02:00 PM          Ordering Location:     [facility]    Received:            01/12/2022 09:35 AM                                 S[facility] ENDO                                                                    Pathologist:           Twin Carrington MD                                                        Specimen:    Esophagus                                                                                 • Clinical Information 01/11/2022    Final                    Value:This result contains rich text formatting which cannot be displayed here.   • Final Diagnosis 01/11/2022    Final                    Value:This result contains rich text formatting which cannot be displayed here.      No radiology results for the last 90 days.    No radiology results for the last 90 days.  EGD 12/4/2021  - Normal oropharynx.  - Z-line regular, 40 cm from the incisors.  - Food in the lower third of the esophagus with distal esophageal stricture and narrowncaliber distal esophagus.  Removal was successful.  - Esophageal mucosal changes consistent with eosinophilic esophagitis. Biopsied.  - Localised esophagitis at food impaction site noted  - Erosive gastropathy with stigmata of recent bleeding. Biopsied.  - Normal duodenal bulb, first portion of the duodenum, second portion of the duodenum and third portion of the  duodenum. Biopsied     EGD  1/11/2022  - Normal oropharynx.  - Z-line regular, 38 cm from the incisors.  - Esophageal mucosal changes consistent with eosinophilic esophagitis with narrow caliber esophagus. Biopsied  to to assess for PPI responsive EoE. Dilated.  - Normal cardia, gastric fundus, gastric body, incisura, antrum and prepyloric region of the stomach.  - Normal duodenal bulb and first portion of the duodenum.    Assessment / Plan      1.  Eosinophilic esophagitis  2.  Chronic esophageal dysphagia  3.  History of esophageal food bolus impaction  4.  History of distal esophageal stricture  5/22/2023  Patient was on budesonide slurry for over a year now.  He still has a ongoing intermittent difficulty in swallowing, but it is much better than before.  His lab work done in January 29 reviewed which reveals a normal CBC and CMP except elevated blood glucose of 177.  He subsequently had a A1c done which was normal.  He is poorly built with a BMI of 18.  As per patient he has been like this since he was a child.    We will continue his PPI Protonix 40 mg p.o. daily  We will start him on a Dupixent 3 mg subcu weekly  We will follow-up in 3 months time  Repeat EGD for surveillance next year in 2024.    2/22/2022  He was doing well since his last EGD and savory dilatation up to 14 mm on 1/11/2022, however started noticing some retrosternal chest discomfort and occasional dysphagia to solids since last few weeks.  His initial biopsies revealed eosinophils more than 40 per high-power field that has come down to 24 high-power field with repeat EGD done on 1/11/2022.  He had a food allergy profile done but I do not have a copy of the report now.  His gastric biopsies were negative for H. pylori.  Advised to avoid food with any moderate and severe allergy.     12/4/2021   He had multiple episodes of food bolus in the past however he was able to pass down without any endoscopy.  He does have some family history of dysphagia especially with a  grandfather but no diagnosed achalasia or EOE in the family.      5.  Left lower loin pain  This is most likely musculoskeletal, and could be related to his duty belt with heavy weight.  CT abdomen pelvis done in January 2023 was unremarkable except prominent liver which could appear prominent relative to his very thin build.    No significant symptoms now.      Follow Up:   No follow-ups on file.    Kolby Guaman MD  [facility] [place]  5/22/2023  16:09 EDT     Please note that portions of this note may have been completed with a voice recognition program.

## 2023-06-08 ENCOUNTER — TELEPHONE (OUTPATIENT)
Dept: GASTROENTEROLOGY | Facility: CLINIC | Age: 28
End: 2023-06-08
Payer: COMMERCIAL

## 2023-06-08 NOTE — TELEPHONE ENCOUNTER
Brittaney called from Dupixent, she has given me his copay card information.  I have called patient, and given him the numbers along with the instructions of what to do online or over the phone.     ID # [id number]  RX BIN#  [id number]  RX PCN:   [employer]  RX GROUP #  [id number]      The patient states understanding and will get this card activated.

## 2023-08-24 ENCOUNTER — OFFICE VISIT (OUTPATIENT)
Dept: GASTROENTEROLOGY | Facility: CLINIC | Age: 28
End: 2023-08-24
Payer: COMMERCIAL

## 2023-08-24 VITALS
HEART RATE: 52 BPM | HEIGHT: 72 IN | TEMPERATURE: 99.1 F | SYSTOLIC BLOOD PRESSURE: 113 MMHG | DIASTOLIC BLOOD PRESSURE: 54 MMHG | WEIGHT: 135 LBS | BODY MASS INDEX: 18.28 KG/M2

## 2023-08-24 DIAGNOSIS — R13.19 ESOPHAGEAL DYSPHAGIA: ICD-10-CM

## 2023-08-24 DIAGNOSIS — K20.0 ESOPHAGITIS, EOSINOPHILIC: Primary | ICD-10-CM

## 2023-08-24 PROCEDURE — 99214 OFFICE O/P EST MOD 30 MIN: CPT | Performed by: INTERNAL MEDICINE

## 2023-08-24 RX ORDER — LEVOCETIRIZINE DIHYDROCHLORIDE 5 MG/1
5 TABLET, FILM COATED ORAL EVERY EVENING
COMMUNITY

## 2023-08-24 RX ORDER — PANTOPRAZOLE SODIUM 20 MG/1
20 TABLET, DELAYED RELEASE ORAL DAILY
Qty: 90 TABLET | Refills: 3 | Status: SHIPPED | OUTPATIENT
Start: 2023-08-24

## 2023-08-24 NOTE — PROGRESS NOTES
Follow Up Note     Date: 2023   Patient Name: Allan Burton  MRN: [id number]  : 1995     Referring Physician: Melissa Jolly DO    Chief Complaint:    Chief Complaint   Patient presents with    EOE    Difficulty Swallowing    Abdominal Pain       Interval History:   2023  Allan Burton is a 27 y.o. male who is here today for follow up for his eosinophilic esophagitis and esophageal dysphagia.  He did not have any issues with the Dupixent.  Doing pretty well without any dysphagia symptoms.    2021  This is 26-year-old young male patient with a prior history of esophageal dysphagia prior history of esophageal food bolus was brought into emergency room at [facility] and subsequently transferred here at [facility] for further management of his food bolus in the esophagus.     He states that he had a chicken roll in the evening for his dinner following which he felt that chicken piece was stuck in the throat and he was not able to swallow any saliva.  He had a similar episodes multiple times in the past and was able to ultimately swallow the food bolus with manipulation and drinking water.  Time he tried to drink water but he could not swallow anything and vomited out.  Also felt little chest discomfort with shortness of breathing shortly that resolved thereafter.  Denies any abdominal pain. He denies any prior history of EGD.  Denies any medications no significant reflux symptoms.  He states that his grandfather had issues with swallowing.    Subjective      Past Medical History:   Past Medical History:   Diagnosis Date    Disease of thyroid gland     Jaundice     as a baby    Murmur     Seasonal allergies     Skin cancer     Face     Past Surgical History:   Past Surgical History:   Procedure Laterality Date    ENDOSCOPY      ENDOSCOPY N/A 2022    Procedure: ESOPHAGOGASTRODUODENOSCOPY WITH DILATATION;  Surgeon: Kolby Guaman MD;  Location:   [facility] ENDOSCOPY;  Service: Gastroenterology;  Laterality: N/A;    ENDOSCOPY WITH FOREIGN BODY REMOVAL N/A 12/4/2021    Procedure: ESOPHAGOGASTRODUODENOSCOPY WITH FOREIGN BODY REMOVAL and biopsy;  Surgeon: Kolby Guaman MD;  Location: [facility] ENDOSCOPY;  Service: Gastroenterology;  Laterality: N/A;    SKIN CANCER EXCISION  2014    WISDOM TOOTH EXTRACTION         Family History:   Family History   Problem Relation Age of Onset    Colon cancer Neg Hx        Social History:   Social History     Socioeconomic History    Marital status: Single   Tobacco Use    Smoking status: Never    Smokeless tobacco: Never   Vaping Use    Vaping Use: Never used   Substance and Sexual Activity    Alcohol use: Never    Drug use: Never    Sexual activity: Defer       Medications:     Current Outpatient Medications:     ALLERGY SERUM INJECTION, Inject  under the skin into the appropriate area as directed 1 (One) Time., Disp: , Rfl:     Ascorbic Acid (VITAMIN C GUMMIE PO), Take  by mouth Daily., Disp: , Rfl:     Cholecalciferol (VITAMIN D3 GUMMIES PO), Take 2 tablets by mouth Daily., Disp: , Rfl:     Dupilumab (Dupixent) 300 MG/2ML solution pen-injector, Inject 2 mL under the skin into the appropriate area as directed 1 (One) Time Per Week., Disp: 3 mL, Rfl: 3    ibuprofen (ADVIL,MOTRIN) 800 MG tablet, Take 1 tablet by mouth 3 (Three) Times a Day As Needed., Disp: , Rfl:     levocetirizine (XYZAL) 5 MG tablet, Take 1 tablet by mouth Every Evening., Disp: , Rfl:     levothyroxine (SYNTHROID, LEVOTHROID) 50 MCG tablet, Take 1 tablet by mouth Daily., Disp: , Rfl:     montelukast (SINGULAIR) 10 MG tablet, Take 1 tablet by mouth Every Morning., Disp: , Rfl:     Multiple Vitamins-Minerals (EQ MULTIVITAMINS ADULT GUMMY PO), Take  by mouth., Disp: , Rfl:     pantoprazole (PROTONIX) 40 MG EC tablet, Take 1 tablet by mouth Daily., Disp: 90 tablet, Rfl: 3    ondansetron ODT (ZOFRAN-ODT) 4 MG disintegrating tablet, Take  by mouth As Needed.  "(Patient not taking: Reported on 8/24/2023), Disp: , Rfl:     Allergies:   No Known Allergies    Review of Systems:   Review of Systems   Constitutional:  Negative for appetite change, fatigue, fever and unexpected weight loss.   HENT:  Negative for trouble swallowing.    Gastrointestinal:  Negative for abdominal distention, abdominal pain, anal bleeding, blood in stool, constipation, diarrhea, nausea, rectal pain, vomiting, GERD and indigestion.     The following portions of the patient's history were reviewed and updated as appropriate: allergies, current medications, past family history, past medical history, past social history, past surgical history and problem list.    Objective     Physical Exam:  Vital Signs:   Vitals:    08/24/23 1711   BP: 113/54   Pulse: 52   Temp: 99.1 øF (37.3 øC)   TempSrc: Infrared   Weight: 61.2 kg (135 lb)   Height: 182.9 cm (72\")  Comment: per patient       Physical Exam  Constitutional:       Comments: Poorly built   HENT:      Head: Normocephalic and atraumatic.   Eyes:      Conjunctiva/sclera: Conjunctivae normal.   Abdominal:      General: Abdomen is flat. There is no distension.      Palpations: There is no mass.      Tenderness: There is no abdominal tenderness. There is no guarding or rebound.      Hernia: No hernia is present.   Musculoskeletal:      Cervical back: Normal range of motion and neck supple.   Neurological:      Mental Status: He is alert.       Results Review:   I reviewed the patient's new clinical results.    No visits with results within 90 Day(s) from this visit.   Latest known visit with results is:   Admission on 01/11/2022, Discharged on 01/11/2022   Component Date Value Ref Range Status    Case Report 01/11/2022    Final                    Value:Surgical Pathology Report                         Case: [id number]                                  Authorizing Provider:  Kolby Guaman MD  Collected:           01/11/2022 02:00 PM          Ordering " Location:     [facility]    Received:            01/12/2022 09:35 AM                                 S[facility] ENDO                                                                    Pathologist:           Twin Carrington MD                                                        Specimen:    Esophagus                                                                                  Clinical Information 01/11/2022    Final                    Value:This result contains rich text formatting which cannot be displayed here.    Final Diagnosis 01/11/2022    Final                    Value:This result contains rich text formatting which cannot be displayed here.      No radiology results for the last 90 days.    No radiology results for the last 90 days.  EGD 12/4/2021  - Normal oropharynx.  - Z-line regular, 40 cm from the incisors.  - Food in the lower third of the esophagus with distal esophageal stricture and narrowncaliber distal esophagus.  Removal was successful.  - Esophageal mucosal changes consistent with eosinophilic esophagitis. Biopsied.  - Localised esophagitis at food impaction site noted  - Erosive gastropathy with stigmata of recent bleeding. Biopsied.  - Normal duodenal bulb, first portion of the duodenum, second portion of the duodenum and third portion of the  duodenum. Biopsied     EGD 1/11/2022  - Normal oropharynx.  - Z-line regular, 38 cm from the incisors.  - Esophageal mucosal changes consistent with eosinophilic esophagitis with narrow caliber esophagus. Biopsied  to to assess for PPI responsive EoE. Dilated.  - Normal cardia, gastric fundus, gastric body, incisura, antrum and prepyloric region of the stomach.  - Normal duodenal bulb and first portion of the duodenum.    Assessment / Plan      1.  Eosinophilic esophagitis  2.  Chronic esophageal dysphagia  3.  History of esophageal food bolus impaction  4.  History of distal esophageal stricture  8/24/2023  He is tolerating very well  Dupixent without any issues.  No current dysphagia or reflux symptoms.  Gained about a pound since the last visit.  No recent lab work to review.  We will reduce his PPI dose Protonix to 20 because p.o. daily  Continue Dupixent 300 mg subcu weekly  CBC CMP while on a Dupixent  We will repeat his EGD in 2024 to assess response to treatment; we will schedule him for an EGD next visit  Ensure 1 can p.o. twice daily    2/22/2022  He was doing well since his last EGD and savory dilatation up to 14 mm on 1/11/2022, however started noticing some retrosternal chest discomfort and occasional dysphagia to solids since last few weeks.  His initial biopsies revealed eosinophils more than 40 per high-power field that has come down to 24 high-power field with repeat EGD done on 1/11/2022.  He had a food allergy profile done but I do not have a copy of the report now.  His gastric biopsies were negative for H. pylori.  Advised to avoid food with any moderate and severe allergy.      12/4/2021  He had multiple episodes of food bolus in the past however he was able to pass down without any endoscopy.  He does have some family history of dysphagia especially with a grandfather but no diagnosed achalasia or EOE in the family.        Follow Up:   No follow-ups on file.    Kolby Guaman MD  [facility] [place]  8/24/2023  17:13 EDT     Please note that portions of this note may have been completed with a voice recognition program.

## 2024-05-08 RX ORDER — DUPILUMAB 300 MG/2ML
INJECTION, SOLUTION SUBCUTANEOUS
Qty: 8 ML | Refills: 11 | Status: SHIPPED | OUTPATIENT
Start: 2024-05-08

## 2024-06-24 ENCOUNTER — OFFICE VISIT (OUTPATIENT)
Dept: GASTROENTEROLOGY | Facility: CLINIC | Age: 29
End: 2024-06-24
Payer: COMMERCIAL

## 2024-06-24 VITALS
SYSTOLIC BLOOD PRESSURE: 118 MMHG | HEART RATE: 62 BPM | BODY MASS INDEX: 18.83 KG/M2 | DIASTOLIC BLOOD PRESSURE: 73 MMHG | WEIGHT: 139 LBS | TEMPERATURE: 97.7 F | HEIGHT: 72 IN

## 2024-06-24 DIAGNOSIS — Z87.898 HISTORY OF DYSPHAGIA: ICD-10-CM

## 2024-06-24 DIAGNOSIS — Z87.19 HISTORY OF ESOPHAGEAL STRICTURE: ICD-10-CM

## 2024-06-24 DIAGNOSIS — K20.0 ESOPHAGITIS, EOSINOPHILIC: Primary | ICD-10-CM

## 2024-06-24 PROCEDURE — 99214 OFFICE O/P EST MOD 30 MIN: CPT | Performed by: INTERNAL MEDICINE

## 2024-06-24 RX ORDER — SODIUM CHLORIDE 9 MG/ML
70 INJECTION, SOLUTION INTRAVENOUS CONTINUOUS PRN
OUTPATIENT
Start: 2024-06-24

## 2024-06-24 NOTE — PROGRESS NOTES
Follow Up Note     Date: 2024   Patient Name: Allan Burton  MRN: [id number]  : 1995     Referring Physician: Melissa Jolly DO    Chief Complaint:    Chief Complaint   Patient presents with    EoE    Difficulty Swallowing       Interval History:   2024  Allan Burton is a 28 y.o. male who is here today for follow up for his eosinophilic esophagitis.  He states that he is feeling much better without any current dysphagia symptoms.  No issues with the Dupixent injection.  No reflux symptoms.     2021  This is 26-year-old young male patient with a prior history of esophageal dysphagia prior history of esophageal food bolus was brought into emergency room at [facility] and subsequently transferred here at [facility] for further management of his food bolus in the esophagus.     He states that he had a chicken roll in the evening for his dinner following which he felt that chicken piece was stuck in the throat and he was not able to swallow any saliva.  He had a similar episodes multiple times in the past and was able to ultimately swallow the food bolus with manipulation and drinking water.  Time he tried to drink water but he could not swallow anything and vomited out.  Also felt little chest discomfort with shortness of breathing shortly that resolved thereafter.  Denies any abdominal pain. He denies any prior history of EGD.  Denies any medications no significant reflux symptoms.  He states that his grandfather had issues with swallowing.    Subjective      Past Medical History:   Past Medical History:   Diagnosis Date    Disease of thyroid gland     GERD (gastroesophageal reflux disease)     Jaundice     as a baby    Murmur     Seasonal allergies     Skin cancer     Face     Past Surgical History:   Past Surgical History:   Procedure Laterality Date    ENDOSCOPY      ENDOSCOPY N/A 2022    Procedure: ESOPHAGOGASTRODUODENOSCOPY WITH DILATATION;   Surgeon: Kolby Guaman MD;  Location: [facility] ENDOSCOPY;  Service: Gastroenterology;  Laterality: N/A;    ENDOSCOPY WITH FOREIGN BODY REMOVAL N/A 12/4/2021    Procedure: ESOPHAGOGASTRODUODENOSCOPY WITH FOREIGN BODY REMOVAL and biopsy;  Surgeon: Kolby Guaman MD;  Location: [facility] ENDOSCOPY;  Service: Gastroenterology;  Laterality: N/A;    SKIN CANCER EXCISION  2014    WISDOM TOOTH EXTRACTION         Family History:   Family History   Problem Relation Age of Onset    Colon cancer Neg Hx        Social History:   Social History     Socioeconomic History    Marital status: Single   Tobacco Use    Smoking status: Never    Smokeless tobacco: Never   Vaping Use    Vaping status: Never Used   Substance and Sexual Activity    Alcohol use: Never    Drug use: Never    Sexual activity: Defer       Medications:     Current Outpatient Medications:     ALLERGY SERUM INJECTION, Inject  under the skin into the appropriate area as directed 1 (One) Time., Disp: , Rfl:     Ascorbic Acid (VITAMIN C GUMMIE PO), Take  by mouth Daily., Disp: , Rfl:     Cholecalciferol (VITAMIN D3 GUMMIES PO), Take 2 tablets by mouth Daily., Disp: , Rfl:     Dupilumab (Dupixent) 300 MG/2ML solution pen-injector, INJECT 1 PEN UNDER THE SKIN EVERY 7 DAYS, Disp: 8 mL, Rfl: 11    ibuprofen (ADVIL,MOTRIN) 800 MG tablet, Take 1 tablet by mouth 3 (Three) Times a Day As Needed., Disp: , Rfl:     levocetirizine (XYZAL) 5 MG tablet, Take 1 tablet by mouth Every Evening., Disp: , Rfl:     levothyroxine (SYNTHROID, LEVOTHROID) 50 MCG tablet, Take 1 tablet by mouth Daily., Disp: , Rfl:     montelukast (SINGULAIR) 10 MG tablet, Take 1 tablet by mouth Every Morning., Disp: , Rfl:     Multiple Vitamins-Minerals (EQ MULTIVITAMINS ADULT GUMMY PO), Take  by mouth., Disp: , Rfl:     pantoprazole (PROTONIX) 20 MG EC tablet, Take 1 tablet by mouth Daily., Disp: 90 tablet, Rfl: 3    ondansetron ODT (ZOFRAN-ODT) 4 MG disintegrating tablet, Take  by mouth As  "Needed. (Patient not taking: Reported on 8/24/2023), Disp: , Rfl:     Allergies:   Allergies   Allergen Reactions    Penicillins Unknown - Low Severity     Possible reaction as child         Review of Systems:   Review of Systems   Constitutional:  Negative for appetite change, fatigue, fever and unexpected weight loss.   HENT:  Negative for trouble swallowing.    Cardiovascular:  Positive for chest pain (with sickness).   Gastrointestinal:  Negative for abdominal distention, abdominal pain, anal bleeding, blood in stool, constipation, diarrhea, nausea, rectal pain, vomiting, GERD and indigestion.       The following portions of the patient's history were reviewed and updated as appropriate: allergies, current medications, past family history, past medical history, past social history, past surgical history and problem list.    Objective     Physical Exam:  Vital Signs:   Vitals:    06/24/24 1634   BP: 118/73   Pulse: 62   Temp: 97.7 °F (36.5 °C)   TempSrc: Infrared   Weight: 63 kg (139 lb)  Comment: with clothing/shoes   Height: 182.9 cm (72\")  Comment: per EPIC       Physical Exam  Constitutional:       Comments: Poorly built   HENT:      Head: Normocephalic and atraumatic.   Eyes:      Conjunctiva/sclera: Conjunctivae normal.   Abdominal:      General: Abdomen is flat. There is no distension.      Palpations: There is no mass.      Tenderness: There is no abdominal tenderness. There is no guarding or rebound.      Hernia: No hernia is present.   Musculoskeletal:      Cervical back: Normal range of motion and neck supple.   Neurological:      Mental Status: He is alert.         Results Review:   I reviewed the patient's new clinical results.    No visits with results within 90 Day(s) from this visit.   Latest known visit with results is:   Admission on 01/11/2022, Discharged on 01/11/2022   Component Date Value Ref Range Status    Case Report 01/11/2022    Final                    Value:Surgical Pathology Report   "                       Case: [id number]                                  Authorizing Provider:  Kolby Guaman MD  Collected:           01/11/2022 02:00 PM          Ordering Location:     [facility]    Received:            01/12/2022 09:35 AM                                 [facility]                                                                    Pathologist:           Twin Carrington MD                                                        Specimen:    Esophagus                                                                                  Clinical Information 01/11/2022    Final                    Value:This result contains rich text formatting which cannot be displayed here.    Final Diagnosis 01/11/2022    Final                    Value:This result contains rich text formatting which cannot be displayed here.      No radiology results for the last 90 days.    EGD 12/4/2021  - Normal oropharynx.  - Z-line regular, 40 cm from the incisors.  - Food in the lower third of the esophagus with distal esophageal stricture and narrowncaliber distal esophagus. Removal was successful.  - Esophageal mucosal changes consistent with eosinophilic esophagitis. Biopsied.  - Localised esophagitis at food impaction site noted  - Erosive gastropathy with stigmata of recent bleeding. Biopsied.  - Normal duodenal bulb, first portion of the duodenum, second portion of the duodenum and third portion of the duodenum. Biopsied     EGD 1/11/2022  - Normal oropharynx.  - Z-line regular, 38 cm from the incisors.  - Esophageal mucosal changes consistent with eosinophilic esophagitis with narrow caliber esophagus. Biopsied to to assess for PPI responsive EoE. Dilated.  - Normal cardia, gastric fundus, gastric body, incisura, antrum and prepyloric region of the stomach.  - Normal duodenal bulb and first portion of the duodenum.    Assessment / Plan      1.  Eosinophilic esophagitis  2.  History of esophageal food  bolus impaction 2021  3.  History of distal esophageal stricture  6/24/2024  Patient is clinically doing well on Dupixent weekly injections.  No current reflux symptoms.  No current dysphagia.  He gained about 4 pounds since last visit.  Will schedule him for an surveillance EGD after year of therapy with the Dupixent.    Continue low-dose PPI Protonix 20 mg p.o. daily  We will schedule for EGD for surveillance  Continue to take Ensure 1 can p.o. twice daily    The indications, technique, alternatives and potential risk and complications were discussed with the patient including but not limited to bleeding, bowel perforations, missing lesions and anesthetic complications. The patient understands and wishes to proceed with the procedure and has given their verbal consent. Written patient education information was given to the patient.   The patient will call if they have further questions before procedure.      2/22/2022  He was doing well since his last EGD and savory dilatation up to 14 mm on 1/11/2022, however started noticing some retrosternal chest discomfort and occasional dysphagia to solids since last few weeks.  His initial biopsies revealed eosinophils more than 40 per high-power field that has come down to 24 high-power field with repeat EGD done on 1/11/2022.  He had a food allergy profile done but I do not have a copy of the report now.  His gastric biopsies were negative for H. pylori.  Advised to avoid food with any moderate and severe allergy.      12/4/2021  He had multiple episodes of food bolus in the past however he was able to pass down without any endoscopy.  He does have some family history of dysphagia especially with a grandfather but no diagnosed achalasia or EOE in the family.        Follow Up:   No follow-ups on file.    Kolby Guaman MD  [facility] [place]  6/24/2024  16:37 EDT     Please note that portions of this note may have been completed with a voice recognition  program.

## 2024-06-26 RX ORDER — DUPILUMAB 300 MG/2ML
300 INJECTION, SOLUTION SUBCUTANEOUS WEEKLY
Qty: 8 ML | Refills: 11 | Status: SHIPPED | OUTPATIENT
Start: 2024-06-26

## 2024-08-26 NOTE — PRE-PROCEDURE INSTRUCTIONS
PAT phone history completed with patient for upcoming procedure on 8/29/24.    PAT PASS reviewed with patient and he/she verbalized understanding of the following:     Do not eat or drink anything after midnight the night before procedure unless otherwise instructed by physician/surgeon's office, this includes no gum, candy, mints, tobacco products or e-cigarettes.  Do not shave the area to be operated on at least 48 hours prior to procedure.  Do not wear makeup, lotion, hair products, or nail polish.  Do not wear any jewelry and remove all piercings.  Do not wear any adhesive if you wear dentures.  Do not wear contacts; bring in glasses if needed.  Only take medications on the morning of procedure as instructed by PAT nurse per anesthesia guidelines or as instructed by physician's office.   If you are on any blood thinners reach out to the physician/surgeon's office for instructions on when/if they will need to be stopped prior to procedure.   Bring in picture ID and insurance card, advanced directive copies if applicable, CPAP/BIPAP/Inhalers if indicated morning of procedure, leave any other valuables at home.  Ensure you have arranged for someone to drive you home the day of your procedure and someone to care for you at home afterwards. It is recommended that you do not drive, drink alcohol, or make any major legal decisions for at least 24 hours after your procedure is complete.    Instructions given on hospital entrance and registration location.

## 2024-08-29 ENCOUNTER — ANESTHESIA EVENT (OUTPATIENT)
Dept: GASTROENTEROLOGY | Facility: HOSPITAL | Age: 29
End: 2024-08-29
Payer: COMMERCIAL

## 2024-08-29 ENCOUNTER — HOSPITAL ENCOUNTER (OUTPATIENT)
Facility: HOSPITAL | Age: 29
Setting detail: HOSPITAL OUTPATIENT SURGERY
Discharge: HOME | End: 2024-08-29
Attending: INTERNAL MEDICINE | Admitting: INTERNAL MEDICINE
Payer: COMMERCIAL

## 2024-08-29 ENCOUNTER — ANESTHESIA (OUTPATIENT)
Dept: GASTROENTEROLOGY | Facility: HOSPITAL | Age: 29
End: 2024-08-29
Payer: COMMERCIAL

## 2024-08-29 VITALS
HEART RATE: 58 BPM | DIASTOLIC BLOOD PRESSURE: 44 MMHG | TEMPERATURE: 97.1 F | SYSTOLIC BLOOD PRESSURE: 100 MMHG | BODY MASS INDEX: 18.85 KG/M2 | WEIGHT: 139 LBS | RESPIRATION RATE: 16 BRPM | OXYGEN SATURATION: 98 %

## 2024-08-29 DIAGNOSIS — K20.0 ESOPHAGITIS, EOSINOPHILIC: ICD-10-CM

## 2024-08-29 PROCEDURE — 25010000002 PROPOFOL 10 MG/ML EMULSION: Performed by: NURSE ANESTHETIST, CERTIFIED REGISTERED

## 2024-08-29 PROCEDURE — 25810000003 SODIUM CHLORIDE 0.9 % SOLUTION: Performed by: INTERNAL MEDICINE

## 2024-08-29 PROCEDURE — 43239 EGD BIOPSY SINGLE/MULTIPLE: CPT | Performed by: INTERNAL MEDICINE

## 2024-08-29 RX ORDER — PROPOFOL 10 MG/ML
VIAL (ML) INTRAVENOUS AS NEEDED
Status: DISCONTINUED | OUTPATIENT
Start: 2024-08-29 | End: 2024-08-29 | Stop reason: SURG

## 2024-08-29 RX ORDER — IPRATROPIUM BROMIDE AND ALBUTEROL SULFATE 2.5; .5 MG/3ML; MG/3ML
3 SOLUTION RESPIRATORY (INHALATION) ONCE AS NEEDED
Status: CANCELLED | OUTPATIENT
Start: 2024-08-29

## 2024-08-29 RX ORDER — SODIUM CHLORIDE 9 MG/ML
70 INJECTION, SOLUTION INTRAVENOUS CONTINUOUS PRN
Status: DISCONTINUED | OUTPATIENT
Start: 2024-08-29 | End: 2024-08-29 | Stop reason: HOSPADM

## 2024-08-29 RX ADMIN — PROPOFOL 100 MG: 10 INJECTION, EMULSION INTRAVENOUS at 09:14

## 2024-08-29 RX ADMIN — SODIUM CHLORIDE 70 ML/HR: 9 INJECTION, SOLUTION INTRAVENOUS at 08:44

## 2024-08-29 RX ADMIN — LIDOCAINE HYDROCHLORIDE 60 MG: 20 INJECTION, SOLUTION INTRAVENOUS at 09:14

## 2024-08-29 RX ADMIN — Medication 20 MG: at 09:14

## 2024-08-29 RX ADMIN — PROPOFOL 75 MCG/KG/MIN: 10 INJECTION, EMULSION INTRAVENOUS at 09:14

## 2024-08-29 NOTE — DISCHARGE INSTRUCTIONS
Rest today  No pushing,pulling,tugging,heavy lifting, or strenuous activity   No major decision making,driving,or drinking alcoholic beverages for 24 hours due to the sedation you received  Always use good hand hygiene/washing technique  No driving on pain medication.    To assist you in voiding:  Drink plenty of fluids  Listen to running water while attempting to void.    If you are unable to urinate and you have an uncomfortable urge to void or it has been   6 hours since you were discharged, return to the Emergency Room.    DC home  Previous diet  Repeat EGD 2-3 yrs   Follow up 8 weeks

## 2024-08-29 NOTE — H&P
[facility]  HISTORY AND PHYSICAL    Patient Name: Allan Burton  : 1995  MRN: [id number]    Chief Complaint:   For EGD    History Of Presenting Illness:      EOE for surveillance      Past Medical History:   Diagnosis Date    Disease of thyroid gland     Eosinophilic esophagitis     takes dupixent    GERD (gastroesophageal reflux disease)     Jaundice     as a baby    Murmur     Seasonal allergies     Skin cancer     Face       Past Surgical History:   Procedure Laterality Date    ENDOSCOPY      ENDOSCOPY N/A 2022    Procedure: ESOPHAGOGASTRODUODENOSCOPY WITH DILATATION;  Surgeon: Kolby Guaman MD;  Location: [facility] ENDOSCOPY;  Service: Gastroenterology;  Laterality: N/A;    ENDOSCOPY WITH FOREIGN BODY REMOVAL N/A 2021    Procedure: ESOPHAGOGASTRODUODENOSCOPY WITH FOREIGN BODY REMOVAL and biopsy;  Surgeon: Kolby Guaman MD;  Location: [facility] ENDOSCOPY;  Service: Gastroenterology;  Laterality: N/A;    SKIN CANCER EXCISION      WISDOM TOOTH EXTRACTION         Social History     Socioeconomic History    Marital status: Single   Tobacco Use    Smoking status: Never    Smokeless tobacco: Never   Vaping Use    Vaping status: Never Used   Substance and Sexual Activity    Alcohol use: Never    Drug use: Never    Sexual activity: Defer       Family History   Problem Relation Age of Onset    Colon cancer Neg Hx        Prior to Admission Medications:  Medications Prior to Admission   Medication Sig Dispense Refill Last Dose    ALLERGY SERUM INJECTION Inject  under the skin into the appropriate area as directed 1 (One) Time Per Week.   Past Month    Ascorbic Acid (VITAMIN C GUMMIE PO) Take  by mouth Daily.   Past Month    Cholecalciferol (VITAMIN D3 GUMMIES PO) Take 2 tablets by mouth Daily.   Past Month    Dupilumab (Dupixent) 300 MG/2ML solution pen-injector Inject 2 mL under the skin into the appropriate area as directed 1 (One) Time Per Week. 8 mL 11 Past Month     ibuprofen (ADVIL,MOTRIN) 800 MG tablet Take 1 tablet by mouth 3 (Three) Times a Day As Needed.   Past Month    levocetirizine (XYZAL) 5 MG tablet Take 1 tablet by mouth Every Evening.   Past Week    levothyroxine (SYNTHROID, LEVOTHROID) 50 MCG tablet Take 1 tablet by mouth Daily.   8/28/2024    montelukast (SINGULAIR) 10 MG tablet Take 1 tablet by mouth Every Morning.   8/28/2024    Multiple Vitamins-Minerals (EQ MULTIVITAMINS ADULT GUMMY PO) Take  by mouth.   Past Month    pantoprazole (PROTONIX) 20 MG EC tablet Take 1 tablet by mouth Daily. 90 tablet 3 8/28/2024    ondansetron ODT (ZOFRAN-ODT) 4 MG disintegrating tablet Take  by mouth As Needed. (Patient not taking: Reported on 8/24/2023)          Allergies:  Allergies   Allergen Reactions    Penicillins Unknown - Low Severity     Possible reaction as child          Vitals: Temp:  [97.1 °F (36.2 °C)] 97.1 °F (36.2 °C)  Heart Rate:  [80] 80  Resp:  [14] 14  BP: (120)/(78) 120/78    Review Of Systems:  Constitutional:  Negative for chills, fever, and unexpected weight change.  Respiratory:  Negative for cough, chest tightness, shortness of breath, and wheezing.  Cardiovascular:  Negative for chest pain, palpitations, and leg swelling.  Gastrointestinal:  Negative for abdominal distention, abdominal pain, nausea, vomiting.  Neurological:  Negative for weakness, numbness, and headaches.     Physical Exam:    General Appearance:  Alert, cooperative, in no acute distress.   Lungs:   Clear to auscultation, respirations regular, even and                 unlabored.   Heart:  Regular rhythm and normal rate.   Abdomen:   Normal bowel sounds, no masses, no organomegaly. Soft, nontender, nondistended   Neurologic: Alert and oriented x 3. Moves all four limbs equally       Assessment & Plan     Assessment:  Principal Problem:    Esophagitis, eosinophilic      Plan: ESOPHAGOGASTRODUODENOSCOPY WITH DILATATION (N/A)     Kolby Guaman MD  8/29/2024

## 2024-08-29 NOTE — ANESTHESIA POSTPROCEDURE EVALUATION
Patient: Allan Burton    Procedure Summary       Date: 08/29/24 Room / Location: [facility] ENDOSCOPY 2 / [facility] ENDOSCOPY    Anesthesia Start: 0911 Anesthesia Stop: 0922    Procedure: ESOPHAGOGASTRODUODENOSCOPY WITH BIOPSY (Esophagus) Diagnosis:       Esophagitis, eosinophilic      (Esophagitis, eosinophilic [K20.0])    Surgeons: Kolby Guaman MD Provider: Oralia De La Rosa CRNA    Anesthesia Type: MAC ASA Status: 2            Anesthesia Type: MAC    Vitals  Vitals Value Taken Time   /66 08/29/24 0940   Temp 97.1 °F (36.2 °C) 08/29/24 0928   Pulse 58 08/29/24 0928   Resp 16 08/29/24 0928   SpO2 100 % 08/29/24 0942   Vitals shown include unfiled device data.        Post Anesthesia Care and Evaluation    Patient location during evaluation: PHASE II  Patient participation: complete - patient participated  Level of consciousness: awake and alert  Pain score: 0  Pain management: satisfactory to patient    Airway patency: patent  Anesthetic complications: No anesthetic complications  PONV Status: none  Cardiovascular status: acceptable and stable  Respiratory status: acceptable  Hydration status: acceptable    Comments: Vitals signs as noted in nursing documentation as per protocol.

## 2024-08-29 NOTE — ANESTHESIA PREPROCEDURE EVALUATION
Anesthesia Evaluation     Patient summary reviewed and Nursing notes reviewed   NPO Solid Status: > 8 hours  NPO Liquid Status: > 8 hours           Airway   Mallampati: II  TM distance: >3 FB  Neck ROM: full  Possible difficult intubation  Dental - normal exam     Pulmonary - negative pulmonary ROS and normal exam    breath sounds clear to auscultation  Cardiovascular - normal exam    Rhythm: regular  Rate: normal    (+) valvular problems/murmurs murmur      Neuro/Psych- negative ROS  GI/Hepatic/Renal/Endo    (+) GERD well controlled, thyroid problem hypothyroidism    Musculoskeletal (-) negative ROS    Abdominal  - normal exam   Substance History - negative use     OB/GYN negative ob/gyn ROS         Other      history of cancer (face) remission                Anesthesia Plan    ASA 2     MAC   total IV anesthesia  intravenous induction     Anesthetic plan, risks, benefits, and alternatives have been provided, discussed and informed consent has been obtained with: patient.    CODE STATUS:

## 2024-08-30 LAB — REF LAB TEST METHOD: NORMAL

## 2024-10-16 ENCOUNTER — TELEPHONE (OUTPATIENT)
Dept: GASTROENTEROLOGY | Facility: CLINIC | Age: 29
End: 2024-10-16
Payer: COMMERCIAL

## 2024-10-16 NOTE — TELEPHONE ENCOUNTER
----- Message from Rubina Peace sent at 10/16/2024 12:27 PM EDT -----  Ok to hold Dupixent until 1 week after steroids  ----- Message -----  From: Yasmin Norman MA  Sent: 10/16/2024   9:47 AM EDT  To: Rubina Peace PA-C    He's been ill, got a antibiotic shot, a B-12 shot, and a steroid shot.  Wants to know should he wait till next week to do Dupixent or can he do it this week?

## 2024-12-03 ENCOUNTER — TELEPHONE (OUTPATIENT)
Dept: GASTROENTEROLOGY | Facility: CLINIC | Age: 29
End: 2024-12-03
Payer: COMMERCIAL

## 2024-12-03 NOTE — TELEPHONE ENCOUNTER
Caller: Allan Burton    Relationship to patient: Self      Best call back number: [phone]    Provider: SHERIGAR    Medication PA needed: DUPIXENT    Reason for call/Prior Auth: HAS NEW INSURANCE THROUGH [employer] AND HAS TO USE THEIR PHARMACY WHICH IS [facility] [employer] [facility] [phone] WAS TOLD HIS DUPIXENT NEEDS A PRIOR AUTH BEFORE THEY CAN FILL

## 2025-04-25 DIAGNOSIS — K20.0 ESOPHAGITIS, EOSINOPHILIC: Primary | ICD-10-CM

## 2025-05-20 ENCOUNTER — TELEPHONE (OUTPATIENT)
Dept: GASTROENTEROLOGY | Facility: CLINIC | Age: 30
End: 2025-05-20
Payer: COMMERCIAL

## 2025-05-20 NOTE — TELEPHONE ENCOUNTER
----- Message from Yasmin KERR sent at 4/25/2025  1:12 PM EDT -----  Regarding: FW: Dupixent    ----- Message -----  From: Rubina Peace PA-C  Sent: 4/25/2025  12:44 PM EDT  To: Yasmin Norman MA  Subject: FW: Dupixent                                     He ordered the EGD- not sure if he sent you a message too  ----- Message -----  From: Kolby Guaman MD  Sent: 4/25/2025  12:19 PM EDT  To: Rubina Peace PA-C  Subject: RE: Dupixent                                     Close CR  for EGD  ----- Message -----  From: Rubina Peace PA-C  Sent: 4/24/2025   2:36 PM EDT  To: Kolby Guaman MD  Subject: Dupixent                                         Per MT- He has a new insurance plan. They denied Dupixent. They want updated labs and a new EGD that is dated after 12/06/2024 that shows a continuation of benefit of med. He has upcoming appt with you.

## 2025-05-20 NOTE — TELEPHONE ENCOUNTER
Called and spoke with patient, scheduled 06/05/25 EGD.    Pt is scheduled for US and OV with sso 1/31/2023.

## 2025-06-04 RX ORDER — HYDROXYZINE HYDROCHLORIDE 25 MG/1
25 TABLET, FILM COATED ORAL NIGHTLY
COMMUNITY

## 2025-06-04 RX ORDER — CITALOPRAM HYDROBROMIDE 10 MG/1
30 TABLET ORAL DAILY
COMMUNITY

## 2025-06-04 NOTE — PRE-PROCEDURE INSTRUCTIONS
PAT phone history completed with patient for upcoming procedure on 6/10/25, with Dr. Guaman.    PAT PASS reviewed with patient and they verbalize understanding of the following:     Do not eat or drink anything after midnight the night before procedure unless otherwise instructed by physician/surgeon's office, this includes no gum, candy, mints, tobacco products or e-cigarettes.  Do not shave the area to be operated on at least 48 hours prior to procedure.  Do not wear makeup, lotion, hair products, or nail polish.  Do not wear any jewelry and remove all piercings.  Do not wear any adhesive if you wear dentures.  Do not wear contacts; bring in glasses if needed.  Only take medications on the morning of procedure as instructed by PAT nurse per anesthesia guidelines or as instructed by physician's office.  If you are on any blood thinners reach out to the physician/surgeon's office for instructions on when/if they will need to be stopped prior to procedure.  Bring in picture ID and insurance card, advanced directive copies if applicable, CPAP/BIPAP/Inhalers if indicated morning of procedure, leave any other valuables at home.  Ensure you have arranged for someone to drive you home the day of your procedure and someone to care for you at home afterwards. It is recommended that you do not drive, drink alcohol, or make any major legal decisions for at least 24 hours after your procedure is complete.  ERAS instructions given unless otherwise instructed per surgeon's orders.    Instructions given on hospital entrance and registration location.

## 2025-06-10 ENCOUNTER — ANESTHESIA (OUTPATIENT)
Dept: GASTROENTEROLOGY | Facility: HOSPITAL | Age: 30
End: 2025-06-10
Payer: COMMERCIAL

## 2025-06-10 ENCOUNTER — HOSPITAL ENCOUNTER (OUTPATIENT)
Facility: HOSPITAL | Age: 30
Setting detail: HOSPITAL OUTPATIENT SURGERY
Discharge: HOME | End: 2025-06-10
Attending: INTERNAL MEDICINE | Admitting: INTERNAL MEDICINE
Payer: COMMERCIAL

## 2025-06-10 ENCOUNTER — ANESTHESIA EVENT (OUTPATIENT)
Dept: GASTROENTEROLOGY | Facility: HOSPITAL | Age: 30
End: 2025-06-10
Payer: COMMERCIAL

## 2025-06-10 VITALS
DIASTOLIC BLOOD PRESSURE: 83 MMHG | RESPIRATION RATE: 16 BRPM | HEART RATE: 55 BPM | OXYGEN SATURATION: 98 % | SYSTOLIC BLOOD PRESSURE: 99 MMHG | BODY MASS INDEX: 18.28 KG/M2 | WEIGHT: 135 LBS | TEMPERATURE: 97 F | HEIGHT: 72 IN

## 2025-06-10 DIAGNOSIS — K20.0 ESOPHAGITIS, EOSINOPHILIC: ICD-10-CM

## 2025-06-10 PROCEDURE — 25010000002 PROPOFOL 10 MG/ML EMULSION: Performed by: NURSE ANESTHETIST, CERTIFIED REGISTERED

## 2025-06-10 RX ORDER — PROPOFOL 10 MG/ML
VIAL (ML) INTRAVENOUS AS NEEDED
Status: DISCONTINUED | OUTPATIENT
Start: 2025-06-10 | End: 2025-06-10 | Stop reason: SURG

## 2025-06-10 RX ORDER — SODIUM CHLORIDE 9 MG/ML
50 INJECTION, SOLUTION INTRAVENOUS CONTINUOUS
Status: DISCONTINUED | OUTPATIENT
Start: 2025-06-10 | End: 2025-06-10 | Stop reason: HOSPADM

## 2025-06-10 RX ORDER — LIDOCAINE HCL/PF 100 MG/5ML
SYRINGE (ML) INJECTION AS NEEDED
Status: DISCONTINUED | OUTPATIENT
Start: 2025-06-10 | End: 2025-06-10 | Stop reason: SURG

## 2025-06-10 RX ADMIN — Medication 50 MG: at 09:17

## 2025-06-10 RX ADMIN — PROPOFOL 200 MG: 10 INJECTION, EMULSION INTRAVENOUS at 09:17

## 2025-06-10 NOTE — ANESTHESIA POSTPROCEDURE EVALUATION
Patient: Allan Burton    Procedure Summary       Date: 06/10/25 Room / Location: [facility] ENDOSCOPY 2 / [facility] ENDOSCOPY    Anesthesia Start: 0909 Anesthesia Stop: 0933    Procedure: Esophagogastroduodenscopy with biopsy (Esophagus) Diagnosis:       Esophagitis, eosinophilic      (Esophagitis, eosinophilic [K20.0])    Surgeons: Kolby Guaman MD Provider: Richard Martinez CRNA    Anesthesia Type: MAC ASA Status: 2            Anesthesia Type: MAC    Vitals  No vitals data found for the desired time range.          Post Anesthesia Care and Evaluation    Patient location during evaluation: bedside  Patient participation: complete - patient participated  Level of consciousness: awake  Pain score: 0  Pain management: adequate    Airway patency: patent  Anesthetic complications: No anesthetic complications  PONV Status: controlled  Cardiovascular status: acceptable and stable  Respiratory status: acceptable and room air  Hydration status: acceptable    Comments: See nursing documentation for post op vital signs

## 2025-06-10 NOTE — ANESTHESIA PREPROCEDURE EVALUATION
Anesthesia Evaluation     Patient summary reviewed and Nursing notes reviewed   no history of anesthetic complications:   NPO Solid Status: > 8 hours  NPO Liquid Status: > 8 hours           Airway   Mallampati: II  TM distance: >3 FB  Neck ROM: full  Possible difficult intubation  Dental - normal exam     Pulmonary - negative pulmonary ROS and normal exam    breath sounds clear to auscultation  Cardiovascular - normal exam  Exercise tolerance: good (4-7 METS)    Rhythm: regular  Rate: normal    (+) valvular problems/murmurs murmur      Neuro/Psych- negative ROS  GI/Hepatic/Renal/Endo    (+) GERD well controlled, thyroid problem hypothyroidism    ROS Comment: Eosinophilic esophagitis     Musculoskeletal (-) negative ROS    Abdominal  - normal exam   Substance History - negative use     OB/GYN negative ob/gyn ROS         Other      history of cancer (face) remission                Anesthesia Plan    ASA 2     MAC   total IV anesthesia  (Risks and benefits discussed including risk of aspiration, recall and dental damage. All patient questions answered. Will continue with POC.)  intravenous induction     Anesthetic plan, risks, benefits, and alternatives have been provided, discussed and informed consent has been obtained with: patient.    CODE STATUS:

## 2025-06-10 NOTE — H&P
[facility]  HISTORY AND PHYSICAL    Patient Name: Allan Burton  : 1995  MRN: [id number]    Chief Complaint:   For EoE    History Of Presenting Illness:    EoE   Dysphagia    Past Medical History:   Diagnosis Date    Disease of thyroid gland     Eosinophilic esophagitis     takes dupixent    GERD (gastroesophageal reflux disease)     Jaundice     as a baby    Murmur     Seasonal allergies     Skin cancer     Face       Past Surgical History:   Procedure Laterality Date    ENDOSCOPY      ENDOSCOPY N/A 2022    Procedure: ESOPHAGOGASTRODUODENOSCOPY WITH DILATATION;  Surgeon: Kolby Guaman MD;  Location: [facility] ENDOSCOPY;  Service: Gastroenterology;  Laterality: N/A;    ENDOSCOPY N/A 2024    Procedure: ESOPHAGOGASTRODUODENOSCOPY WITH BIOPSY;  Surgeon: Kolby Guaman MD;  Location: [facility] ENDOSCOPY;  Service: Gastroenterology;  Laterality: N/A;    ENDOSCOPY WITH FOREIGN BODY REMOVAL N/A 2021    Procedure: ESOPHAGOGASTRODUODENOSCOPY WITH FOREIGN BODY REMOVAL and biopsy;  Surgeon: Kolby Guaman MD;  Location: [facility] ENDOSCOPY;  Service: Gastroenterology;  Laterality: N/A;    SKIN CANCER EXCISION  2014    WISDOM TOOTH EXTRACTION         Social History     Socioeconomic History    Marital status: Single   Tobacco Use    Smoking status: Never    Smokeless tobacco: Never   Vaping Use    Vaping status: Never Used   Substance and Sexual Activity    Alcohol use: Never    Drug use: Never    Sexual activity: Defer       Family History   Problem Relation Age of Onset    Colon cancer Neg Hx        Prior to Admission Medications:  Medications Prior to Admission   Medication Sig Dispense Refill Last Dose/Taking    ALLERGY SERUM INJECTION Inject  under the skin into the appropriate area as directed 1 (One) Time Per Week.   2025    Ascorbic Acid (VITAMIN C GUMMIE PO) Take  by mouth Daily.   2025    Cholecalciferol (VITAMIN D3 GUMMIES PO) Take 2 tablets by mouth  Daily.   6/9/2025    citalopram (CeleXA) 10 MG tablet Take 3 tablets by mouth Daily.   6/9/2025    Dupilumab (Dupixent) 300 MG/2ML solution pen-injector Inject 2 mL under the skin into the appropriate area as directed 1 (One) Time Per Week. 8 mL 11 6/9/2025    Elderberry-Vitamin C-Zinc (ELDERBERRY IMMUNE HEALTH GUMMY PO) Take  by mouth Daily.   6/9/2025    hydrOXYzine (ATARAX) 25 MG tablet Take 1 tablet by mouth Every Night.   6/9/2025    ibuprofen (ADVIL,MOTRIN) 800 MG tablet Take 1 tablet by mouth 3 (Three) Times a Day As Needed.   6/9/2025    levocetirizine (XYZAL) 5 MG tablet Take 1 tablet by mouth Every Evening.   6/9/2025    levothyroxine (SYNTHROID, LEVOTHROID) 50 MCG tablet Take 1 tablet by mouth Daily.   6/9/2025    montelukast (SINGULAIR) 10 MG tablet Take 1 tablet by mouth Every Morning.   6/9/2025    Multiple Vitamins-Minerals (EQ MULTIVITAMINS ADULT GUMMY PO) Take  by mouth.   6/9/2025    pantoprazole (PROTONIX) 20 MG EC tablet Take 1 tablet by mouth Daily. 90 tablet 3 6/9/2025       Allergies:  Allergies   Allergen Reactions    Penicillins Unknown - Low Severity     Possible reaction as child          Vitals: Temp:  [98 °F (36.7 °C)] 98 °F (36.7 °C)  Heart Rate:  [58] 58  Resp:  [16] 16  BP: (112)/(68) 112/68    Review Of Systems:  Constitutional:  Negative for chills, fever, and unexpected weight change.  Respiratory:  Negative for cough, chest tightness, shortness of breath, and wheezing.  Cardiovascular:  Negative for chest pain, palpitations, and leg swelling.  Gastrointestinal:  Negative for abdominal distention, abdominal pain, nausea, vomiting.  Neurological:  Negative for weakness, numbness, and headaches.     Physical Exam:    General Appearance:  Alert, cooperative, in no acute distress.   Lungs:   Clear to auscultation, respirations regular, even and                 unlabored.   Heart:  Regular rhythm and normal rate.   Abdomen:   Normal bowel sounds, no masses, no organomegaly. Soft,  nontender, nondistended   Neurologic: Alert and oriented x 3. Moves all four limbs equally       Assessment & Plan     Assessment:  Principal Problem:    Esophagitis, eosinophilic      Plan: Esophagogastroduodenscopy possible biopsy, polypectomy, dilatation, endoscopic band ligation, ablation of AVMs or control of bleeding (N/A)     Kolby Guaman MD  6/10/2025

## 2025-06-10 NOTE — DISCHARGE INSTRUCTIONS
- Discharge patient to home (ambulatory).   - Resume previous diet.   - Continue present medications.   - Follow an antireflux regimen.   - Continue Dupixent  - Await pathology results.   - Return to my office in 8 weeks.     No pushing, pulling, tugging,  heavy lifting, or strenuous activity.  No major decision making, driving, or drinking alcoholic beverages for 24 hours. ( due to the medications you have  received)  Always use good hand hygiene/washing techniques.  NO driving while taking pain medications.    * if you have an incision:  Check your incision area every day for signs of infection.   Check for:  * more redness, swelling, or pain  *more fluid or blood  *warmth  *pus or bad smellTo assist you in voiding:  Drink plenty of fluids  Listen to running water while attempting to void.    If you are unable to urinate and you have an uncomfortable urge to void or it has been   6 hours since you were discharged, return to the Emergency Room

## 2025-06-11 LAB — REF LAB TEST METHOD: NORMAL

## 2025-07-17 ENCOUNTER — TELEPHONE (OUTPATIENT)
Dept: GASTROENTEROLOGY | Facility: CLINIC | Age: 30
End: 2025-07-17
Payer: COMMERCIAL

## 2025-08-13 ENCOUNTER — TELEMEDICINE (OUTPATIENT)
Dept: GASTROENTEROLOGY | Facility: CLINIC | Age: 30
End: 2025-08-13
Payer: COMMERCIAL

## 2025-08-13 DIAGNOSIS — K20.0 ESOPHAGITIS, EOSINOPHILIC: Primary | ICD-10-CM

## 2025-08-13 DIAGNOSIS — R13.19 ESOPHAGEAL DYSPHAGIA: ICD-10-CM

## 2025-08-13 DIAGNOSIS — Z87.19 HISTORY OF ESOPHAGEAL STRICTURE: ICD-10-CM

## 2025-08-13 DIAGNOSIS — Z87.898 HISTORY OF DYSPHAGIA: ICD-10-CM

## 2025-08-13 PROCEDURE — 99213 OFFICE O/P EST LOW 20 MIN: CPT | Performed by: INTERNAL MEDICINE

## 2025-08-15 ENCOUNTER — TELEPHONE (OUTPATIENT)
Dept: GASTROENTEROLOGY | Facility: CLINIC | Age: 30
End: 2025-08-15
Payer: COMMERCIAL

## (undated) DEVICE — CONMED SCOPE SAVER BITE BLOCK, 20X27 MM: Brand: SCOPE SAVER

## (undated) DEVICE — VLV SXN AIR/H2O ORCAPOD3 1P/U STRL

## (undated) DEVICE — SINGLE-USE POLYPECTOMY SNARE: Brand: CAPTIVATOR II

## (undated) DEVICE — HYBRID TUBING/CAP SET FOR OLYMPUS® SCOPES: Brand: ERBE

## (undated) DEVICE — Device

## (undated) DEVICE — ENDOSCOPY PORT CONNECTOR FOR OLYMPUS® SCOPES: Brand: ERBE

## (undated) DEVICE — SUCTION CANISTER, 1500CC, RIGID: Brand: DEROYAL

## (undated) DEVICE — FRCP BX RADJAW4 NDL 2.8 240 STD OG

## (undated) DEVICE — HYBRID CO2 TUBING/CAP SET FOR OLYMPUS® SCOPES & CO2 SOURCE: Brand: ERBE

## (undated) DEVICE — CANISTER, RIGID, 2000CC: Brand: MEDLINE INDUSTRIES, INC.